# Patient Record
Sex: MALE | Race: WHITE | Employment: OTHER | URBAN - METROPOLITAN AREA
[De-identification: names, ages, dates, MRNs, and addresses within clinical notes are randomized per-mention and may not be internally consistent; named-entity substitution may affect disease eponyms.]

---

## 2019-05-29 ENCOUNTER — HOSPITAL ENCOUNTER (INPATIENT)
Age: 69
LOS: 6 days | Discharge: NURSING, REHAB OR LONG-TERM CARE | DRG: 189 | End: 2019-06-06
Attending: EMERGENCY MEDICINE | Admitting: INTERNAL MEDICINE
Payer: MEDICARE

## 2019-05-29 DIAGNOSIS — R09.02 HYPOXIA: ICD-10-CM

## 2019-05-29 DIAGNOSIS — J40 BRONCHITIS: ICD-10-CM

## 2019-05-29 DIAGNOSIS — J44.1 COPD EXACERBATION (HCC): Primary | ICD-10-CM

## 2019-05-29 PROCEDURE — 99285 EMERGENCY DEPT VISIT HI MDM: CPT

## 2019-05-29 PROCEDURE — 31500 INSERT EMERGENCY AIRWAY: CPT

## 2019-05-30 ENCOUNTER — APPOINTMENT (OUTPATIENT)
Dept: GENERAL RADIOLOGY | Age: 69
DRG: 189 | End: 2019-05-30
Payer: MEDICARE

## 2019-05-30 PROBLEM — J44.1 COPD EXACERBATION (HCC): Status: ACTIVE | Noted: 2019-05-30

## 2019-05-30 LAB
ALBUMIN SERPL-MCNC: 3.9 G/DL (ref 3.5–5.2)
ALP BLD-CCNC: 57 U/L (ref 40–129)
ALT SERPL-CCNC: 22 U/L (ref 0–40)
ANION GAP SERPL CALCULATED.3IONS-SCNC: 11 MMOL/L (ref 7–16)
AST SERPL-CCNC: 18 U/L (ref 0–39)
B.E.: 0.6 MMOL/L (ref -3–3)
BASOPHILS ABSOLUTE: 0.04 E9/L (ref 0–0.2)
BASOPHILS RELATIVE PERCENT: 0.5 % (ref 0–2)
BILIRUB SERPL-MCNC: 0.4 MG/DL (ref 0–1.2)
BUN BLDV-MCNC: 16 MG/DL (ref 8–23)
CALCIUM SERPL-MCNC: 8.7 MG/DL (ref 8.6–10.2)
CHLORIDE BLD-SCNC: 98 MMOL/L (ref 98–107)
CO2: 26 MMOL/L (ref 22–29)
COHB: 1.4 % (ref 0–1.5)
CREAT SERPL-MCNC: 0.9 MG/DL (ref 0.7–1.2)
CRITICAL: ABNORMAL
D DIMER: 254 NG/ML DDU
DATE ANALYZED: ABNORMAL
DATE OF COLLECTION: ABNORMAL
EKG ATRIAL RATE: 74 BPM
EKG P AXIS: 66 DEGREES
EKG P-R INTERVAL: 190 MS
EKG Q-T INTERVAL: 390 MS
EKG QRS DURATION: 90 MS
EKG QTC CALCULATION (BAZETT): 432 MS
EKG R AXIS: -47 DEGREES
EKG T AXIS: 54 DEGREES
EKG VENTRICULAR RATE: 74 BPM
EOSINOPHILS ABSOLUTE: 0.27 E9/L (ref 0.05–0.5)
EOSINOPHILS RELATIVE PERCENT: 3.2 % (ref 0–6)
GFR AFRICAN AMERICAN: >60
GFR NON-AFRICAN AMERICAN: >60 ML/MIN/1.73
GLUCOSE BLD-MCNC: 124 MG/DL (ref 74–99)
HCO3: 27.2 MMOL/L (ref 22–26)
HCT VFR BLD CALC: 52.7 % (ref 37–54)
HEMOGLOBIN: 17.7 G/DL (ref 12.5–16.5)
HHB: 4.2 % (ref 0–5)
IMMATURE GRANULOCYTES #: 0.03 E9/L
IMMATURE GRANULOCYTES %: 0.4 % (ref 0–5)
LAB: ABNORMAL
LACTIC ACID: 0.9 MMOL/L (ref 0.5–2.2)
LYMPHOCYTES ABSOLUTE: 1.59 E9/L (ref 1.5–4)
LYMPHOCYTES RELATIVE PERCENT: 18.8 % (ref 20–42)
Lab: ABNORMAL
MCH RBC QN AUTO: 30.8 PG (ref 26–35)
MCHC RBC AUTO-ENTMCNC: 33.6 % (ref 32–34.5)
MCV RBC AUTO: 91.7 FL (ref 80–99.9)
METHB: 0.3 % (ref 0–1.5)
MODE: ABNORMAL
MONOCYTES ABSOLUTE: 0.61 E9/L (ref 0.1–0.95)
MONOCYTES RELATIVE PERCENT: 7.2 % (ref 2–12)
NEUTROPHILS ABSOLUTE: 5.91 E9/L (ref 1.8–7.3)
NEUTROPHILS RELATIVE PERCENT: 69.9 % (ref 43–80)
O2 CONTENT: 24.1 ML/DL
O2 SATURATION: 95.7 % (ref 92–98.5)
O2HB: 94.1 % (ref 94–97)
OPERATOR ID: ABNORMAL
PATIENT TEMP: 37 C
PCO2: 50.2 MMHG (ref 35–45)
PDW BLD-RTO: 13.5 FL (ref 11.5–15)
PH BLOOD GAS: 7.35 (ref 7.35–7.45)
PLATELET # BLD: 174 E9/L (ref 130–450)
PMV BLD AUTO: 9.8 FL (ref 7–12)
PO2: 79.4 MMHG (ref 60–100)
POTASSIUM SERPL-SCNC: 4.2 MMOL/L (ref 3.5–5)
PRO-BNP: 402 PG/ML (ref 0–125)
RBC # BLD: 5.75 E12/L (ref 3.8–5.8)
SODIUM BLD-SCNC: 135 MMOL/L (ref 132–146)
SOURCE, BLOOD GAS: ABNORMAL
THB: 18.2 G/DL (ref 11.5–16.5)
TIME ANALYZED: 105
TOTAL PROTEIN: 7.4 G/DL (ref 6.4–8.3)
TROPONIN: <0.01 NG/ML (ref 0–0.03)
WBC # BLD: 8.5 E9/L (ref 4.5–11.5)

## 2019-05-30 PROCEDURE — G0378 HOSPITAL OBSERVATION PER HR: HCPCS

## 2019-05-30 PROCEDURE — 2580000003 HC RX 258: Performed by: EMERGENCY MEDICINE

## 2019-05-30 PROCEDURE — 96375 TX/PRO/DX INJ NEW DRUG ADDON: CPT

## 2019-05-30 PROCEDURE — 99222 1ST HOSP IP/OBS MODERATE 55: CPT | Performed by: INTERNAL MEDICINE

## 2019-05-30 PROCEDURE — 6370000000 HC RX 637 (ALT 250 FOR IP): Performed by: INTERNAL MEDICINE

## 2019-05-30 PROCEDURE — 82805 BLOOD GASES W/O2 SATURATION: CPT

## 2019-05-30 PROCEDURE — 96367 TX/PROPH/DG ADDL SEQ IV INF: CPT

## 2019-05-30 PROCEDURE — 80053 COMPREHEN METABOLIC PANEL: CPT

## 2019-05-30 PROCEDURE — 96376 TX/PRO/DX INJ SAME DRUG ADON: CPT

## 2019-05-30 PROCEDURE — 96365 THER/PROPH/DIAG IV INF INIT: CPT

## 2019-05-30 PROCEDURE — 6360000002 HC RX W HCPCS: Performed by: EMERGENCY MEDICINE

## 2019-05-30 PROCEDURE — 97165 OT EVAL LOW COMPLEX 30 MIN: CPT

## 2019-05-30 PROCEDURE — 94640 AIRWAY INHALATION TREATMENT: CPT

## 2019-05-30 PROCEDURE — 6370000000 HC RX 637 (ALT 250 FOR IP): Performed by: EMERGENCY MEDICINE

## 2019-05-30 PROCEDURE — 6360000002 HC RX W HCPCS: Performed by: INTERNAL MEDICINE

## 2019-05-30 PROCEDURE — APPSS45 APP SPLIT SHARED TIME 31-45 MINUTES: Performed by: PHYSICIAN ASSISTANT

## 2019-05-30 PROCEDURE — 6360000002 HC RX W HCPCS: Performed by: PHYSICIAN ASSISTANT

## 2019-05-30 PROCEDURE — 36415 COLL VENOUS BLD VENIPUNCTURE: CPT

## 2019-05-30 PROCEDURE — 83605 ASSAY OF LACTIC ACID: CPT

## 2019-05-30 PROCEDURE — 2700000000 HC OXYGEN THERAPY PER DAY

## 2019-05-30 PROCEDURE — 71045 X-RAY EXAM CHEST 1 VIEW: CPT

## 2019-05-30 PROCEDURE — 83880 ASSAY OF NATRIURETIC PEPTIDE: CPT

## 2019-05-30 PROCEDURE — 84484 ASSAY OF TROPONIN QUANT: CPT

## 2019-05-30 PROCEDURE — 93010 ELECTROCARDIOGRAM REPORT: CPT | Performed by: INTERNAL MEDICINE

## 2019-05-30 PROCEDURE — 93005 ELECTROCARDIOGRAM TRACING: CPT | Performed by: EMERGENCY MEDICINE

## 2019-05-30 PROCEDURE — 6370000000 HC RX 637 (ALT 250 FOR IP): Performed by: PHYSICIAN ASSISTANT

## 2019-05-30 PROCEDURE — 2580000003 HC RX 258: Performed by: PHYSICIAN ASSISTANT

## 2019-05-30 PROCEDURE — 85025 COMPLETE CBC W/AUTO DIFF WBC: CPT

## 2019-05-30 PROCEDURE — 96366 THER/PROPH/DIAG IV INF ADDON: CPT

## 2019-05-30 PROCEDURE — G0378 HOSPITAL OBSERVATION PER HR: HCPCS | Performed by: INTERNAL MEDICINE

## 2019-05-30 PROCEDURE — 85378 FIBRIN DEGRADE SEMIQUANT: CPT

## 2019-05-30 PROCEDURE — 96372 THER/PROPH/DIAG INJ SC/IM: CPT

## 2019-05-30 PROCEDURE — 87040 BLOOD CULTURE FOR BACTERIA: CPT

## 2019-05-30 PROCEDURE — 97161 PT EVAL LOW COMPLEX 20 MIN: CPT

## 2019-05-30 RX ORDER — ONDANSETRON 2 MG/ML
4 INJECTION INTRAMUSCULAR; INTRAVENOUS EVERY 6 HOURS PRN
Status: DISCONTINUED | OUTPATIENT
Start: 2019-05-30 | End: 2019-06-06 | Stop reason: HOSPADM

## 2019-05-30 RX ORDER — METHYLPREDNISOLONE SODIUM SUCCINATE 125 MG/2ML
125 INJECTION, POWDER, LYOPHILIZED, FOR SOLUTION INTRAMUSCULAR; INTRAVENOUS ONCE
Status: COMPLETED | OUTPATIENT
Start: 2019-05-30 | End: 2019-05-30

## 2019-05-30 RX ORDER — 0.9 % SODIUM CHLORIDE 0.9 %
500 INTRAVENOUS SOLUTION INTRAVENOUS ONCE
Status: COMPLETED | OUTPATIENT
Start: 2019-05-30 | End: 2019-05-30

## 2019-05-30 RX ORDER — NEBULIZER ACCESSORIES
1 KIT MISCELLANEOUS DAILY PRN
Qty: 1 KIT | Refills: 0 | Status: SHIPPED | OUTPATIENT
Start: 2019-05-30 | End: 2019-06-04 | Stop reason: HOSPADM

## 2019-05-30 RX ORDER — FUROSEMIDE 10 MG/ML
40 INJECTION INTRAMUSCULAR; INTRAVENOUS 2 TIMES DAILY
Status: DISCONTINUED | OUTPATIENT
Start: 2019-05-30 | End: 2019-05-31

## 2019-05-30 RX ORDER — PREDNISONE 20 MG/1
40 TABLET ORAL DAILY
Qty: 10 TABLET | Refills: 0 | Status: SHIPPED | OUTPATIENT
Start: 2019-05-30 | End: 2019-06-04 | Stop reason: HOSPADM

## 2019-05-30 RX ORDER — METHYLPREDNISOLONE SODIUM SUCCINATE 40 MG/ML
40 INJECTION, POWDER, LYOPHILIZED, FOR SOLUTION INTRAMUSCULAR; INTRAVENOUS EVERY 6 HOURS
Status: DISCONTINUED | OUTPATIENT
Start: 2019-05-30 | End: 2019-06-01

## 2019-05-30 RX ORDER — SODIUM CHLORIDE 0.9 % (FLUSH) 0.9 %
10 SYRINGE (ML) INJECTION PRN
Status: DISCONTINUED | OUTPATIENT
Start: 2019-05-30 | End: 2019-05-30 | Stop reason: SDUPTHER

## 2019-05-30 RX ORDER — ACETAMINOPHEN 325 MG/1
650 TABLET ORAL EVERY 4 HOURS PRN
Status: DISCONTINUED | OUTPATIENT
Start: 2019-05-30 | End: 2019-05-30 | Stop reason: SDUPTHER

## 2019-05-30 RX ORDER — SODIUM CHLORIDE 0.9 % (FLUSH) 0.9 %
10 SYRINGE (ML) INJECTION EVERY 12 HOURS SCHEDULED
Status: DISCONTINUED | OUTPATIENT
Start: 2019-05-30 | End: 2019-06-06 | Stop reason: HOSPADM

## 2019-05-30 RX ORDER — SODIUM CHLORIDE 0.9 % (FLUSH) 0.9 %
10 SYRINGE (ML) INJECTION PRN
Status: DISCONTINUED | OUTPATIENT
Start: 2019-05-30 | End: 2019-06-06 | Stop reason: HOSPADM

## 2019-05-30 RX ORDER — CLONIDINE HYDROCHLORIDE 0.1 MG/1
0.1 TABLET ORAL ONCE
Status: COMPLETED | OUTPATIENT
Start: 2019-05-30 | End: 2019-05-30

## 2019-05-30 RX ORDER — SODIUM CHLORIDE 0.9 % (FLUSH) 0.9 %
10 SYRINGE (ML) INJECTION EVERY 12 HOURS SCHEDULED
Status: DISCONTINUED | OUTPATIENT
Start: 2019-05-30 | End: 2019-05-30 | Stop reason: SDUPTHER

## 2019-05-30 RX ORDER — POTASSIUM CHLORIDE 20 MEQ/1
20 TABLET, EXTENDED RELEASE ORAL
Status: DISCONTINUED | OUTPATIENT
Start: 2019-05-30 | End: 2019-05-31

## 2019-05-30 RX ORDER — LORATADINE AND PSEUDOEPHEDRINE SULFATE 5; 120 MG/1; MG/1
1 TABLET, EXTENDED RELEASE ORAL 2 TIMES DAILY
Qty: 20 TABLET | Refills: 0 | Status: SHIPPED | OUTPATIENT
Start: 2019-05-30 | End: 2019-06-04 | Stop reason: HOSPADM

## 2019-05-30 RX ORDER — DOXYCYCLINE HYCLATE 100 MG
100 TABLET ORAL 2 TIMES DAILY
Qty: 20 TABLET | Refills: 0 | Status: SHIPPED | OUTPATIENT
Start: 2019-05-30 | End: 2019-06-04 | Stop reason: HOSPADM

## 2019-05-30 RX ORDER — ALBUTEROL SULFATE 2.5 MG/3ML
2.5 SOLUTION RESPIRATORY (INHALATION) EVERY 6 HOURS PRN
Qty: 120 EACH | Refills: 3 | Status: SHIPPED | OUTPATIENT
Start: 2019-05-30 | End: 2019-06-04 | Stop reason: HOSPADM

## 2019-05-30 RX ORDER — IPRATROPIUM BROMIDE AND ALBUTEROL SULFATE 2.5; .5 MG/3ML; MG/3ML
1 SOLUTION RESPIRATORY (INHALATION) EVERY 4 HOURS
Status: COMPLETED | OUTPATIENT
Start: 2019-05-30 | End: 2019-05-30

## 2019-05-30 RX ORDER — IPRATROPIUM BROMIDE AND ALBUTEROL SULFATE 2.5; .5 MG/3ML; MG/3ML
1 SOLUTION RESPIRATORY (INHALATION)
Status: COMPLETED | OUTPATIENT
Start: 2019-05-30 | End: 2019-05-30

## 2019-05-30 RX ORDER — ACETAMINOPHEN 325 MG/1
650 TABLET ORAL EVERY 4 HOURS PRN
Status: DISCONTINUED | OUTPATIENT
Start: 2019-05-30 | End: 2019-06-06 | Stop reason: HOSPADM

## 2019-05-30 RX ADMIN — METHYLPREDNISOLONE SODIUM SUCCINATE 125 MG: 125 INJECTION, POWDER, FOR SOLUTION INTRAMUSCULAR; INTRAVENOUS at 01:01

## 2019-05-30 RX ADMIN — CLONIDINE HYDROCHLORIDE 0.1 MG: 0.1 TABLET ORAL at 07:52

## 2019-05-30 RX ADMIN — IPRATROPIUM BROMIDE AND ALBUTEROL SULFATE 1 AMPULE: .5; 3 SOLUTION RESPIRATORY (INHALATION) at 01:15

## 2019-05-30 RX ADMIN — IPRATROPIUM BROMIDE AND ALBUTEROL SULFATE 1 AMPULE: .5; 3 SOLUTION RESPIRATORY (INHALATION) at 01:09

## 2019-05-30 RX ADMIN — SODIUM CHLORIDE 500 ML: 9 INJECTION, SOLUTION INTRAVENOUS at 00:52

## 2019-05-30 RX ADMIN — FUROSEMIDE 40 MG: 10 INJECTION, SOLUTION INTRAMUSCULAR; INTRAVENOUS at 17:47

## 2019-05-30 RX ADMIN — ENOXAPARIN SODIUM 50 MG: 100 INJECTION SUBCUTANEOUS at 21:57

## 2019-05-30 RX ADMIN — CEFTRIAXONE 1 G: 1 INJECTION, POWDER, FOR SOLUTION INTRAMUSCULAR; INTRAVENOUS at 01:03

## 2019-05-30 RX ADMIN — METOPROLOL TARTRATE 12.5 MG: 25 TABLET ORAL at 11:46

## 2019-05-30 RX ADMIN — Medication 10 ML: at 11:44

## 2019-05-30 RX ADMIN — POTASSIUM CHLORIDE 20 MEQ: 20 TABLET, EXTENDED RELEASE ORAL at 11:47

## 2019-05-30 RX ADMIN — AZITHROMYCIN MONOHYDRATE 500 MG: 500 INJECTION, POWDER, LYOPHILIZED, FOR SOLUTION INTRAVENOUS at 04:11

## 2019-05-30 RX ADMIN — IPRATROPIUM BROMIDE AND ALBUTEROL SULFATE 1 AMPULE: .5; 3 SOLUTION RESPIRATORY (INHALATION) at 12:42

## 2019-05-30 RX ADMIN — FUROSEMIDE 40 MG: 10 INJECTION, SOLUTION INTRAMUSCULAR; INTRAVENOUS at 11:44

## 2019-05-30 RX ADMIN — METHYLPREDNISOLONE SODIUM SUCCINATE 40 MG: 40 INJECTION, POWDER, FOR SOLUTION INTRAMUSCULAR; INTRAVENOUS at 15:46

## 2019-05-30 RX ADMIN — IPRATROPIUM BROMIDE AND ALBUTEROL SULFATE 1 AMPULE: .5; 3 SOLUTION RESPIRATORY (INHALATION) at 16:11

## 2019-05-30 RX ADMIN — ENOXAPARIN SODIUM 40 MG: 40 INJECTION SUBCUTANEOUS at 11:44

## 2019-05-30 RX ADMIN — Medication 10 ML: at 21:58

## 2019-05-30 ASSESSMENT — PAIN DESCRIPTION - LOCATION: LOCATION: LEG

## 2019-05-30 ASSESSMENT — PAIN SCALES - GENERAL: PAINLEVEL_OUTOF10: 2

## 2019-05-30 ASSESSMENT — PAIN DESCRIPTION - PAIN TYPE: TYPE: ACUTE PAIN

## 2019-05-30 ASSESSMENT — PAIN DESCRIPTION - ORIENTATION: ORIENTATION: RIGHT;LEFT

## 2019-05-30 NOTE — ED PROVIDER NOTES
Department of Emergency Medicine   ED  Provider Note  Admit Date/RoomTime: 5/29/2019 11:36 PM  ED Room: 09/09     HPI:    Trice Duggan is a 76 y.o. male presenting to the ED per EMS for shortness of breath, beginning 2 weeks ago. The complaint has been persistent, mild in severity, and worsened by nothing. Pt with Hx of A-flutter, CHF, presents to the ED due to gradual onset SOB with associated productive cough that began 2 weeks ago and became worsened over the last 24 hours. Pt states he is coughing up yellow phlegm, denies fever, N/V/D. Pt was recently on xarelto, recently taken off. Patient denies congestion, chest pain, headache, abdominal pain, constipation, hematochezia, melena, incontinence, dysuria, hematuria, trauma, neck or back pain or other complaints. ROS:   Pertinent positives and negatives are stated within HPI, all other systems reviewed and are negative.    ---------------------------------------- PAST HISTORY -------------------------------------------  Past Medical History:  has a past medical history of Atrial flutter ([facility] [address] and CHF (congestive heart failure) ([facility] [address] Past Surgical History:  has no past surgical history on file. Social History:  reports that he quit smoking about 3 years ago. His smoking use included cigarettes. He started smoking about 51 years ago. He smoked 1.50 packs per day. He has never used smokeless tobacco. He reports that he does not drink alcohol or use drugs. Family History: family history includes Alcohol Abuse in his brother; Crohn's Disease in his sister; Heart Failure in his father and mother. The patients home medications have been reviewed. Allergies: Patient has no known allergies.     --------------------------------------------- RESULTS -----------------------------------------------  All laboratory and radiology results have been personally reviewed by myself   LABS:  Results for orders placed or performed during the hospital encounter of 05/29/19   Troponin   Result Value Ref Range    Troponin <0.01 0.00 - 0.03 ng/mL   Brain Natriuretic Peptide   Result Value Ref Range    Pro- (H) 0 - 125 pg/mL   CBC Auto Differential   Result Value Ref Range    WBC 8.5 4.5 - 11.5 E9/L    RBC 5.75 3.80 - 5.80 E12/L    Hemoglobin 17.7 (H) 12.5 - 16.5 g/dL    Hematocrit 52.7 37.0 - 54.0 %    MCV 91.7 80.0 - 99.9 fL    MCH 30.8 26.0 - 35.0 pg    MCHC 33.6 32.0 - 34.5 %    RDW 13.5 11.5 - 15.0 fL    Platelets 628 309 - 530 E9/L    MPV 9.8 7.0 - 12.0 fL    Neutrophils % 69.9 43.0 - 80.0 %    Immature Granulocytes % 0.4 0.0 - 5.0 %    Lymphocytes % 18.8 (L) 20.0 - 42.0 %    Monocytes % 7.2 2.0 - 12.0 %    Eosinophils % 3.2 0.0 - 6.0 %    Basophils % 0.5 0.0 - 2.0 %    Neutrophils # 5.91 1.80 - 7.30 E9/L    Immature Granulocytes # 0.03 E9/L    Lymphocytes # 1.59 1.50 - 4.00 E9/L    Monocytes # 0.61 0.10 - 0.95 E9/L    Eosinophils # 0.27 0.05 - 0.50 E9/L    Basophils # 0.04 0.00 - 0.20 E9/L   Comprehensive Metabolic Panel   Result Value Ref Range    Sodium 135 132 - 146 mmol/L    Potassium 4.2 3.5 - 5.0 mmol/L    Chloride 98 98 - 107 mmol/L    CO2 26 22 - 29 mmol/L    Anion Gap 11 7 - 16 mmol/L    Glucose 124 (H) 74 - 99 mg/dL    BUN 16 8 - 23 mg/dL    CREATININE 0.9 0.7 - 1.2 mg/dL    GFR Non-African American >60 >=60 mL/min/1.73    GFR African American >60     Calcium 8.7 8.6 - 10.2 mg/dL    Total Protein 7.4 6.4 - 8.3 g/dL    Alb 3.9 3.5 - 5.2 g/dL    Total Bilirubin 0.4 0.0 - 1.2 mg/dL    Alkaline Phosphatase 57 40 - 129 U/L    ALT 22 0 - 40 U/L    AST 18 0 - 39 U/L   Lactic Acid, Plasma   Result Value Ref Range    Lactic Acid 0.9 0.5 - 2.2 mmol/L   Blood Gas, Arterial   Result Value Ref Range    Date Analyzed [id number]     Time Analyzed 0105     Source: Blood Arterial     pH, Blood Gas 7.352 7.350 - 7.450    PCO2 50.2 (H) 35.0 - 45.0 mmHg    PO2 79.4 60.0 - 100.0 mmHg    HCO3 27.2 (H) 22.0 - 26.0 mmol/L    B.E. 0.6 -3.0 - 3.0 mmol/L    O2 Sat 95.7 92.0 - 98.5 or rubs. 2+ distal pulses  Chest: No chest wall tenderness  GI:  Abdomen Soft, Non tender, Non distended. +BS. No rebound, guarding, or rigidity. No HPSM, no masses. Musculoskeletal: Moves all extremities x 4. Warm and well perfused, no clubbing, cyanosis, or edema. No calf tenderness  Integument: Skin warm and dry. No rashes. Neurologic: GCS 15, no focal deficits. Cranial nerves II-XII grossly intact. No meningeal signs. Psych: Normal Affect. No evidence of signs or sx of depression or psychosis.     ------------------------- ED COURSE/MEDICAL DECISION MAKING----------------------  Medications   0.9 % sodium chloride bolus (0 mLs Intravenous Stopped 5/30/19 0308)   methylPREDNISolone sodium (SOLU-MEDROL) injection 125 mg (125 mg Intravenous Given 5/30/19 0101)   ipratropium-albuterol (DUONEB) nebulizer solution 1 ampule (1 ampule Inhalation Given 5/30/19 0115)   cefTRIAXone (ROCEPHIN) 1 g in dextrose 5 % 50 mL IVPB (vial-mate) (0 g Intravenous Stopped 5/30/19 0410)   azithromycin (ZITHROMAX) 500 mg in D5W 250ml addavial (0 mg Intravenous Stopped 5/30/19 0515)       Medical Decision Making:   Differential Diagnosis:   Pneumonia, COPD, Asthma, CHF, MI, to name a few. Pt's CXR did not show any focal infiltrates nor any pattern of pulmonary edema  Pt. Work up results did not show any significant abnormalities and patient is felt to be appropriate for outpatient management. Re-Evaluation:  Time:   Re-evaluation. Patients symptoms   Repeat physical examination is     Counseling: The emergency provider has spoken with the patient and spouse/SO and discussed todays results, in addition to providing specific details for the plan of care and counseling regarding the diagnosis and prognosis. Questions are answered at this time and they are agreeable with the plan.    ---------------------------- IMPRESSION AND DISPOSITION -------------------------------    IMPRESSION  1. [facility]    2.  Bronchitis

## 2019-05-30 NOTE — ED NOTES
notified that patient spo2 88% on 3L/min NC     [facility]  06/66/81 4356

## 2019-05-30 NOTE — ED NOTES
Pt's wife states that she is concerned about safely getting the pt home; pt has been unable to ambulate much at home and spends most of his time sitting in a chair and urinating in bottles     Elisabeth Solorio RN  05/30/19 [id number]

## 2019-05-30 NOTE — ED NOTES
SBAR faxed to floor spoke with Everton Graham, copy of fax verificarion received and in chart, pt. to be transported to floor on portable cardiac monitor by ED staff.      Jayden Killian RN  05/30/19 [id number]

## 2019-05-30 NOTE — PROGRESS NOTES
obesity, SOB, weakness and fatigue in completion of all self care tasks. Strength ROM Additional Info:    RUE  4-/5 WFL good  and FMC/dexterity noted during ADL tasks     LUE 4-/5 WFL good  and FMC/dexterity noted during ADL tasks         Hearing: WFL   Vision: WFL   Sensation:  No c/o numbness or tingling   Tone: WFL   Edema: none                             Comments/Treatment: Patient educated on adapted techniques for completion of ADL, safe functional transfers and mobility. Patient required cues for follow through with proper hand/foot placement, pacing, safety, attention, sequencing and technique in bed mobility, functional transfers, UB dressing, LB dressing in preparation for maximum independence in all self care tasks. Eval Complexity: Low    Assessment of current deficits   Functional mobility [x]  ADLs [x] Strength [x]  Cognition []  Functional transfers  [x] IADLs [x] Safety Awareness [x]  Endurance [x]  Fine Motor Coordination [] Balance [x] Vision/perception [] Sensation []   Gross Motor Coordination [] ROM [] Delirium []                  Motor Control []    Plan of Care:   ADL retraining [x]   Equipment needs [x]   Neuromuscular re-education [x] Energy Conservation Techniques [x]  Functional Transfer training [x] Patient and/or Family Education [x]  Functional Mobility training [x]  Environmental Modifications [x]  Cognitive re-training []   Compensatory techniques for ADLs [x]  Splinting Needs []   Positioning to improve overall function [x]   Therapeutic Activity [x]  Therapeutic Exercise  [x]  Visual/Perceptual: []    Delirium prevention/treatment  []   Other:  []    Rehab Potential: Good for established goals     Patient / Family Goal: To get stronger. Patient and/or family were instructed on functional diagnosis, prognosis/goals and OT plan of care. Pt verbalized good understanding. Upon arrival, patient supine in bed.  At end of session, patient supine in bed with call light and phone within reach, all lines and tubes intact. Pt would benefit from continued skilled OT to increase safety and independence with completion of ADL/IADL tasks for functional independence and quality of life.  Bed/chair alarm: ON.     low Evaluation + 10 timed treatment minutes  Tx Time in: 1325  Tx Time out: 1335    Evaluation time includes thorough review of current medical information, gathering information on past medical history/social history and prior level of function, completion of standardized testing/informal observation of tasks, assessment of data, and development of POC/Goals    [place] OTR/L  [id number]
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
## 2019-05-30 NOTE — PROGRESS NOTES
Physical Therapy    Facility/Department: [facility] MED SURG  Initial Assessment    NAME: Milli Winters  : 1950  MRN: [id number]    Date of Service: 2019       REQUIRES PT FOLLOW UP: Yes       Patient Diagnosis(es): The primary encounter diagnosis was COPD exacerbation ([facility] [address] Diagnoses of Bronchitis and Hypoxia were also pertinent to this visit. has a past medical history of Atrial flutter ([facility] [address] and CHF (congestive heart failure) ([facility] [address] has no past surgical history on file. Evaluating Therapist: Maura Silveira PT         Room #: 105   DIAGNOSIS:  COPD exacerbation   PRECAUTIONS: falls     Social:  Pt lives with wife  in a  1  floor apartment no  steps  to enter. Prior to admission: pt reports he has not been walking. Independent with transfers per pt      Initial Evaluation  Date:  19 Treatment      Short Term/ Long Term   Goals   Was pt agreeable to Eval/treatment? yes      Does pt have pain?  chronic back pain      Bed Mobility  Rolling:  SBA   Supine to sit: max assist   Sit to supine: SBA   Scooting: SBA in sit . Mod assist in supine and repositioning    SBA    Transfers Sit to stand: mod assist   Stand to sit: min assist   Stand pivot:  NT   SBA    Ambulation   unable   10  feet with  ww  with  Min assist    LE ROM  AAROM WFL      LE strength  3- to 4-/ 5       AM- PAC RAW score              Pt is alert and Oriented x  3      Balance:  CGA /min assist with static stand , high fall risk     Endurance: poor, labored breathing with minimal activity   Chair alarm: yes      ASSESSMENT  Pt displays functional ability as noted in the objective portion of this evaluation. Comments/Treatment:   Pt required frequent rest breaks with mobility. Able to scoot in sit to [facility] with increased time.    Pt RTB after mobility per his request        Examination of body systems Decreased   Functional mobility  x   ROM    Strength x   Safety Awareness    Cognition    Endurance x   Sensation    Balance

## 2019-05-30 NOTE — ED NOTES
Bed: 09  Expected date:   Expected time:   Means of arrival:   Comments:  EMS     Adrián Manzo RN  53/87/06 2333

## 2019-05-30 NOTE — CONSULTS
incontinence. No hematuria. · Musculoskeletal: No gait disturbance, weakness or joint complaints. · Integumentary: No rash or pruritis. No abnormal pigmentation, hair or nail changes. · Neurological: No headache, diplopia, dizziness, tremor, change in muscle strength, numbness or tingling. No change in gait, balance, coordination, mood, affect, memory, mentation, behavior. · Psychiatric: No anxiety or depression. · Endocrine: No temperature intolerance, excessive thirst, fluid intake, urinary frequency, excessive appetite, or recent weight change. · Hematologic/Lymphatic: No abnormal bruising or bleeding, blood clots or swollen lymph nodes. No anemia, fever, chills, night sweats, or swollen glands. · Allergic/Immunologic: No seasonal or perenial allergies. No history of hives or atopic dermatitis. Social History:  · Alcohol:  No  · Tobacco:   Roughly 75 pack years, quit   · Employment:  no silica or asbestos exposure  · Family:  No family history of lung disease    Medications:     metoprolol tartrate  12.5 mg Oral Daily    potassium chloride  20 mEq Oral Daily with breakfast    furosemide  40 mg Intravenous BID    sodium chloride flush  10 mL Intravenous 2 times per day    enoxaparin  50 mg Subcutaneous BID    methylPREDNISolone  40 mg Intravenous Q6H    ipratropium-albuterol  1 ampule Inhalation Q4H       Vitals:  Tmax:  VITALS:  BP (!) 167/90   Pulse 83   Temp 97.2 °F (36.2 °C) (Axillary)   Resp 24   Ht 5' 8\" (1.727 m)   Wt (!) 357 lb 0.3 oz (161.9 kg)   SpO2 92%   BMI 54.28 kg/m²   24HR INTAKE/OUTPUT:      Intake/Output Summary (Last 24 hours) at 2019 1231  Last data filed at 2019 1125  Gross per 24 hour   Intake 490 ml   Output 100 ml   Net 390 ml     CURRENT PULSE OXIMETRY:  SpO2: 92 %  24HR PULSE OXIMETRY RANGE:  SpO2  Av.8 %  Min: 90 %  Max: 95 %    EXAM:  General: No distress. Alert. Eyes: PERRL. No sclera icterus. No conjunctival injection. ENT: No discharge. given the well's score of 0 and the respiratory acidosis seen. Thanks for letting us see this patient in consultation. Please contact us with any questions. Office [phone] or after hours through [employer], x [phone]. Please note that voice recognition technology was used in the preparation of this note and make therefore it may contain inadvertent transcription errors    Gilma Díaz M.D., F.C.C.P.     [facility], [address] [place] [address], [address] [place] [address]

## 2019-05-31 ENCOUNTER — APPOINTMENT (OUTPATIENT)
Dept: GENERAL RADIOLOGY | Age: 69
DRG: 189 | End: 2019-05-31
Payer: MEDICARE

## 2019-05-31 LAB
ALBUMIN SERPL-MCNC: 4.1 G/DL (ref 3.5–5.2)
ALP BLD-CCNC: 52 U/L (ref 40–129)
ALT SERPL-CCNC: 29 U/L (ref 0–40)
ANION GAP SERPL CALCULATED.3IONS-SCNC: 13 MMOL/L (ref 7–16)
AST SERPL-CCNC: 33 U/L (ref 0–39)
BASOPHILS ABSOLUTE: 0.01 E9/L (ref 0–0.2)
BASOPHILS RELATIVE PERCENT: 0.1 % (ref 0–2)
BILIRUB SERPL-MCNC: 0.3 MG/DL (ref 0–1.2)
BUN BLDV-MCNC: 20 MG/DL (ref 8–23)
CALCIUM SERPL-MCNC: 8.7 MG/DL (ref 8.6–10.2)
CHLORIDE BLD-SCNC: 101 MMOL/L (ref 98–107)
CO2: 27 MMOL/L (ref 22–29)
CREAT SERPL-MCNC: 0.9 MG/DL (ref 0.7–1.2)
EOSINOPHILS ABSOLUTE: 0 E9/L (ref 0.05–0.5)
EOSINOPHILS RELATIVE PERCENT: 0 % (ref 0–6)
GFR AFRICAN AMERICAN: >60
GFR NON-AFRICAN AMERICAN: >60 ML/MIN/1.73
GLUCOSE BLD-MCNC: 167 MG/DL (ref 74–99)
HCT VFR BLD CALC: 54 % (ref 37–54)
HEMOGLOBIN: 17.8 G/DL (ref 12.5–16.5)
IMMATURE GRANULOCYTES #: 0.04 E9/L
IMMATURE GRANULOCYTES %: 0.4 % (ref 0–5)
LYMPHOCYTES ABSOLUTE: 1.19 E9/L (ref 1.5–4)
LYMPHOCYTES RELATIVE PERCENT: 10.9 % (ref 20–42)
MCH RBC QN AUTO: 30.4 PG (ref 26–35)
MCHC RBC AUTO-ENTMCNC: 33 % (ref 32–34.5)
MCV RBC AUTO: 92.2 FL (ref 80–99.9)
MONOCYTES ABSOLUTE: 0.56 E9/L (ref 0.1–0.95)
MONOCYTES RELATIVE PERCENT: 5.1 % (ref 2–12)
NEUTROPHILS ABSOLUTE: 9.08 E9/L (ref 1.8–7.3)
NEUTROPHILS RELATIVE PERCENT: 83.5 % (ref 43–80)
PDW BLD-RTO: 13.6 FL (ref 11.5–15)
PLATELET # BLD: 227 E9/L (ref 130–450)
PMV BLD AUTO: 10 FL (ref 7–12)
POTASSIUM REFLEX MAGNESIUM: 5.6 MMOL/L (ref 3.5–5)
RBC # BLD: 5.86 E12/L (ref 3.8–5.8)
SODIUM BLD-SCNC: 141 MMOL/L (ref 132–146)
TOTAL PROTEIN: 7.5 G/DL (ref 6.4–8.3)
WBC # BLD: 10.9 E9/L (ref 4.5–11.5)

## 2019-05-31 PROCEDURE — 36415 COLL VENOUS BLD VENIPUNCTURE: CPT

## 2019-05-31 PROCEDURE — 6360000002 HC RX W HCPCS: Performed by: PHYSICIAN ASSISTANT

## 2019-05-31 PROCEDURE — 6360000002 HC RX W HCPCS: Performed by: INTERNAL MEDICINE

## 2019-05-31 PROCEDURE — 1200000000 HC SEMI PRIVATE

## 2019-05-31 PROCEDURE — 85025 COMPLETE CBC W/AUTO DIFF WBC: CPT

## 2019-05-31 PROCEDURE — APPSS30 APP SPLIT SHARED TIME 16-30 MINUTES: Performed by: PHYSICIAN ASSISTANT

## 2019-05-31 PROCEDURE — 80053 COMPREHEN METABOLIC PANEL: CPT

## 2019-05-31 PROCEDURE — 99232 SBSQ HOSP IP/OBS MODERATE 35: CPT | Performed by: INTERNAL MEDICINE

## 2019-05-31 PROCEDURE — G0378 HOSPITAL OBSERVATION PER HR: HCPCS

## 2019-05-31 PROCEDURE — 72070 X-RAY EXAM THORAC SPINE 2VWS: CPT

## 2019-05-31 PROCEDURE — 72100 X-RAY EXAM L-S SPINE 2/3 VWS: CPT

## 2019-05-31 PROCEDURE — 2700000000 HC OXYGEN THERAPY PER DAY

## 2019-05-31 PROCEDURE — 96376 TX/PRO/DX INJ SAME DRUG ADON: CPT

## 2019-05-31 PROCEDURE — 96372 THER/PROPH/DIAG INJ SC/IM: CPT

## 2019-05-31 PROCEDURE — 2580000003 HC RX 258: Performed by: PHYSICIAN ASSISTANT

## 2019-05-31 PROCEDURE — 6370000000 HC RX 637 (ALT 250 FOR IP): Performed by: PHYSICIAN ASSISTANT

## 2019-05-31 RX ORDER — FUROSEMIDE 10 MG/ML
40 INJECTION INTRAMUSCULAR; INTRAVENOUS DAILY
Status: DISCONTINUED | OUTPATIENT
Start: 2019-06-01 | End: 2019-05-31

## 2019-05-31 RX ORDER — POTASSIUM CHLORIDE 750 MG/1
10 TABLET, EXTENDED RELEASE ORAL
Status: DISCONTINUED | OUTPATIENT
Start: 2019-06-01 | End: 2019-06-06 | Stop reason: HOSPADM

## 2019-05-31 RX ORDER — FUROSEMIDE 40 MG/1
40 TABLET ORAL DAILY
Status: DISCONTINUED | OUTPATIENT
Start: 2019-06-01 | End: 2019-06-06 | Stop reason: HOSPADM

## 2019-05-31 RX ADMIN — METOPROLOL TARTRATE 12.5 MG: 25 TABLET ORAL at 08:37

## 2019-05-31 RX ADMIN — Medication 10 ML: at 20:13

## 2019-05-31 RX ADMIN — ENOXAPARIN SODIUM 50 MG: 100 INJECTION SUBCUTANEOUS at 20:13

## 2019-05-31 RX ADMIN — Medication 10 ML: at 12:38

## 2019-05-31 RX ADMIN — ENOXAPARIN SODIUM 50 MG: 100 INJECTION SUBCUTANEOUS at 08:36

## 2019-05-31 RX ADMIN — METHYLPREDNISOLONE SODIUM SUCCINATE 40 MG: 40 INJECTION, POWDER, FOR SOLUTION INTRAMUSCULAR; INTRAVENOUS at 07:00

## 2019-05-31 RX ADMIN — METHYLPREDNISOLONE SODIUM SUCCINATE 40 MG: 40 INJECTION, POWDER, FOR SOLUTION INTRAMUSCULAR; INTRAVENOUS at 12:37

## 2019-05-31 RX ADMIN — Medication 10 ML: at 08:37

## 2019-05-31 RX ADMIN — METHYLPREDNISOLONE SODIUM SUCCINATE 40 MG: 40 INJECTION, POWDER, FOR SOLUTION INTRAMUSCULAR; INTRAVENOUS at 17:40

## 2019-05-31 RX ADMIN — Medication 10 ML: at 17:40

## 2019-05-31 RX ADMIN — FUROSEMIDE 40 MG: 10 INJECTION, SOLUTION INTRAMUSCULAR; INTRAVENOUS at 08:37

## 2019-05-31 RX ADMIN — METHYLPREDNISOLONE SODIUM SUCCINATE 40 MG: 40 INJECTION, POWDER, FOR SOLUTION INTRAMUSCULAR; INTRAVENOUS at 03:06

## 2019-05-31 ASSESSMENT — PAIN SCALES - GENERAL: PAINLEVEL_OUTOF10: 0
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
## 2019-05-31 NOTE — CARE COORDINATION
CM met with pt in room to discuss role, anticipated LOS & current plan of care. Made INP admission today. IMM explained, signed & copy provided to pt. States he just met with the SW & she will be going through his wife for all decisions. Will follow.

## 2019-05-31 NOTE — PLAN OF CARE
Problem: Risk for Impaired Skin Integrity  Goal: Tissue integrity - skin and mucous membranes  Description  Structural intactness and normal physiological function of skin and  mucous membranes.   Outcome: Met This Shift     Problem: Falls - Risk of:  Goal: Will remain free from falls  Description  Will remain free from falls  Outcome: Met This Shift     Problem: Mobility - Impaired:  Goal: Mobility will improve  Description  Mobility will improve  Outcome: Ongoing     Problem: Breathing Pattern - Ineffective:  Goal: Ability to achieve and maintain a regular respiratory rate will improve  Description  Ability to achieve and maintain a regular respiratory rate will improve  Outcome: Ongoing

## 2019-05-31 NOTE — PROGRESS NOTES
Date: 5/30/2019    Time: 10:06 PM    Patient Placed On BIPAP/CPAP/ Non-Invasive Ventilation? No    If no must comment. Facial area red/color change? No           If YES are Blister/Lesion present? No   If yes must notify nursing staff  BIPAP/CPAP skin barrier? No    Skin barrier type:n/a       Comments: Patient refused to wear BiPAP. Patient told me \"get the hell out of here, I will throw the BiPAP away if you try to put it on me\". Machine in room on standby at bedside if needed.         Bassam Valle
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
## 2019-05-31 NOTE — PROGRESS NOTES
CALCIUM 8.7 8.7       Recent Labs     05/30/19  0044 05/31/19  0220   WBC 8.5 10.9   RBC 5.75 5.86*   HGB 17.7* 17.8*   HCT 52.7 54.0   MCV 91.7 92.2   MCH 30.8 30.4   MCHC 33.6 33.0   RDW 13.5 13.6    227   MPV 9.8 10.0       Radiology:   XR CHEST PORTABLE   Final Result      Cardiomegaly      No evidence of airspace consolidation or pulmonary venous congestion. Assessment:    Active Problems:    COPD exacerbation (HCC)  Resolved Problems:    * No resolved hospital problems. *      Plan:  1. Acute Hypoxic Respiratory Failure   - Pulm following, appreciate their recommendations   - On solumedrol 40mg Q6  - Will decrease lasix to 40 daily     2. HTN   - Improved, continue Metoprolol     3. Hyperkalemia   - Specimen Hemolyzed, may be artificially elevated, will continue  To monitor   - K 5.6 today, on KCL 20 mEq d/t lasix, not given today, will decrease to 10mEq    4. Deconditioning   - PT score 11/24, appreciate social work input     5.  H/o Atrial Flutter   - no longer on Xarelto, rate well controlled       Electronically signed by Jared Kelley PA-C on 5/31/2019 at 10:11 AM

## 2019-06-01 LAB
ALBUMIN SERPL-MCNC: 4.1 G/DL (ref 3.5–5.2)
ALP BLD-CCNC: 49 U/L (ref 40–129)
ALT SERPL-CCNC: 32 U/L (ref 0–40)
ANION GAP SERPL CALCULATED.3IONS-SCNC: 10 MMOL/L (ref 7–16)
AST SERPL-CCNC: 21 U/L (ref 0–39)
BILIRUB SERPL-MCNC: 0.3 MG/DL (ref 0–1.2)
BUN BLDV-MCNC: 24 MG/DL (ref 8–23)
CALCIUM SERPL-MCNC: 9.1 MG/DL (ref 8.6–10.2)
CHLORIDE BLD-SCNC: 98 MMOL/L (ref 98–107)
CO2: 31 MMOL/L (ref 22–29)
CREAT SERPL-MCNC: 0.9 MG/DL (ref 0.7–1.2)
GFR AFRICAN AMERICAN: >60
GFR NON-AFRICAN AMERICAN: >60 ML/MIN/1.73
GLUCOSE BLD-MCNC: 133 MG/DL (ref 74–99)
LV EF: 60 %
LVEF MODALITY: NORMAL
POTASSIUM SERPL-SCNC: 4.1 MMOL/L (ref 3.5–5)
SODIUM BLD-SCNC: 139 MMOL/L (ref 132–146)
TOTAL PROTEIN: 7.2 G/DL (ref 6.4–8.3)

## 2019-06-01 PROCEDURE — 2700000000 HC OXYGEN THERAPY PER DAY

## 2019-06-01 PROCEDURE — 94660 CPAP INITIATION&MGMT: CPT

## 2019-06-01 PROCEDURE — 6370000000 HC RX 637 (ALT 250 FOR IP): Performed by: PHYSICIAN ASSISTANT

## 2019-06-01 PROCEDURE — 99232 SBSQ HOSP IP/OBS MODERATE 35: CPT | Performed by: INTERNAL MEDICINE

## 2019-06-01 PROCEDURE — 6370000000 HC RX 637 (ALT 250 FOR IP): Performed by: INTERNAL MEDICINE

## 2019-06-01 PROCEDURE — 1200000000 HC SEMI PRIVATE

## 2019-06-01 PROCEDURE — 93306 TTE W/DOPPLER COMPLETE: CPT

## 2019-06-01 PROCEDURE — 6360000002 HC RX W HCPCS: Performed by: INTERNAL MEDICINE

## 2019-06-01 PROCEDURE — 2580000003 HC RX 258: Performed by: PHYSICIAN ASSISTANT

## 2019-06-01 PROCEDURE — 36415 COLL VENOUS BLD VENIPUNCTURE: CPT

## 2019-06-01 PROCEDURE — 80053 COMPREHEN METABOLIC PANEL: CPT

## 2019-06-01 PROCEDURE — 94640 AIRWAY INHALATION TREATMENT: CPT

## 2019-06-01 RX ORDER — IPRATROPIUM BROMIDE AND ALBUTEROL SULFATE 2.5; .5 MG/3ML; MG/3ML
1 SOLUTION RESPIRATORY (INHALATION) EVERY 4 HOURS
Status: DISCONTINUED | OUTPATIENT
Start: 2019-06-01 | End: 2019-06-02

## 2019-06-01 RX ADMIN — ENOXAPARIN SODIUM 50 MG: 100 INJECTION SUBCUTANEOUS at 09:13

## 2019-06-01 RX ADMIN — IPRATROPIUM BROMIDE AND ALBUTEROL SULFATE 1 AMPULE: .5; 3 SOLUTION RESPIRATORY (INHALATION) at 12:40

## 2019-06-01 RX ADMIN — ENOXAPARIN SODIUM 50 MG: 100 INJECTION SUBCUTANEOUS at 20:57

## 2019-06-01 RX ADMIN — IPRATROPIUM BROMIDE AND ALBUTEROL SULFATE 1 AMPULE: .5; 3 SOLUTION RESPIRATORY (INHALATION) at 16:02

## 2019-06-01 RX ADMIN — IPRATROPIUM BROMIDE AND ALBUTEROL SULFATE 1 AMPULE: .5; 3 SOLUTION RESPIRATORY (INHALATION) at 20:07

## 2019-06-01 RX ADMIN — METHYLPREDNISOLONE SODIUM SUCCINATE 40 MG: 40 INJECTION, POWDER, FOR SOLUTION INTRAMUSCULAR; INTRAVENOUS at 06:49

## 2019-06-01 RX ADMIN — Medication 10 ML: at 01:05

## 2019-06-01 RX ADMIN — FUROSEMIDE 40 MG: 40 TABLET ORAL at 06:50

## 2019-06-01 RX ADMIN — Medication 10 ML: at 20:57

## 2019-06-01 RX ADMIN — METHYLPREDNISOLONE SODIUM SUCCINATE 40 MG: 40 INJECTION, POWDER, FOR SOLUTION INTRAMUSCULAR; INTRAVENOUS at 01:05

## 2019-06-01 RX ADMIN — Medication 10 ML: at 09:13

## 2019-06-01 RX ADMIN — METOPROLOL TARTRATE 12.5 MG: 25 TABLET ORAL at 09:13

## 2019-06-01 RX ADMIN — POTASSIUM CHLORIDE 10 MEQ: 10 TABLET, EXTENDED RELEASE ORAL at 14:25

## 2019-06-01 ASSESSMENT — PAIN SCALES - GENERAL: PAINLEVEL_OUTOF10: 0

## 2019-06-01 NOTE — PROGRESS NOTES
Pulmonary Progress Note    Admit Date: 2019  Hospital day                               PCP: Savannah Ackerman DO    Chief Complaint (s):  Patient Active Problem List   Diagnosis    COPD exacerbation ([facility] [address]       Subjective:  · Payam tolerated noninvasive mechanical ventilation quite well last night. He notes that he slept until 8 AM when they woke him up. He feels well rested today and is awake and alert. Vitals:  VITALS:  BP (!) 115/59   Pulse 67   Temp 98.5 °F (36.9 °C) (Oral)   Resp 18   Ht 5' 8\" (1.727 m)   Wt (!) 357 lb 0.3 oz (161.9 kg)   SpO2 92%   BMI 54.28 kg/m²     24HR INTAKE/OUTPUT:      Intake/Output Summary (Last 24 hours) at 2019 1202  Last data filed at 2019 1142  Gross per 24 hour   Intake 600 ml   Output 1575 ml   Net -975 ml       24HR PULSE OXIMETRY RANGE:    SpO2  Av.5 %  Min: 92 %  Max: 93 %    Medications:  IV:      Scheduled Meds:   potassium chloride  10 mEq Oral Daily with breakfast    furosemide  40 mg Oral Daily    metoprolol tartrate  12.5 mg Oral Daily    sodium chloride flush  10 mL Intravenous 2 times per day    enoxaparin  50 mg Subcutaneous BID    methylPREDNISolone  40 mg Intravenous Q6H       Diet:   DIET CARB CONTROL;     EXAM:  General: No distress. Alert. Eyes: PERRL. No sclera icterus. No conjunctival injection. ENT: No discharge. Pharynx clear. Neck: Trachea midline. Normal thyroid. Resp: No accessory muscle use. No rales. No wheezing. No rhonchi. CV: Regular rate. Regular rhythm. No murmur or rub. Abd: Non-tender. Non-distended. No masses. No organomegaly. Normal bowel sounds. Morbidly obese. Skin: Warm and dry. No nodule on exposed extremities. No rash on exposed extremities. Ext: No cyanosis, clubbing, edema  Lymph: No cervical LAD. No supraclavicular LAD. M/S: No cyanosis. No joint deformity. No clubbing. Neuro: Awake. Follows commands. Positive pupils/gag/corneals. Normal pain response. Results:  CBC:   Recent Labs     05/30/19 0044 05/31/19 0220   WBC 8.5 10.9   HGB 17.7* 17.8*   HCT 52.7 54.0   MCV 91.7 92.2    227     BMP:   Recent Labs     05/30/19 0044 05/31/19 0220    141   K 4.2 5.6*   CL 98 101   CO2 26 27   BUN 16 20   CREATININE 0.9 0.9     LIVER PROFILE:   Recent Labs     05/30/19 0044 05/31/19 0220   AST 18 33   ALT 22 29   BILITOT 0.4 0.3   ALKPHOS 57 52     PT/INR: No results for input(s): PROTIME, INR in the last 72 hours. APTT: No results for input(s): APTT in the last 72 hours. Pathology:  1. N/A      Microbiology:  1. None    Recent ABG:   Recent Labs     05/30/19 0105   PH 7.352   PO2 79.4   PCO2 50.2*   HCO3 27.2*   BE 0.6   O2SAT 95.7   METHB 0.3   O2HB 94.1   COHB 1.4   O2CON 24.1   HHB 4.2   THB 18.2*     FiO2 : 35 %       Recent Films:  XR LUMBAR SPINE (2-3 VIEWS)   Final Result    Findings consistent with moderate degenerative disc disease and   degenerative facets disease of the lumbar spine. The exam has been dictated and signed by Sarah Caldwell. Danielle Holbrook CNP. Dariel Duncan MD, Radiologist, have reviewed the images and   report and concur with these findings. XR THORACIC SPINE (2 VIEWS)   Final Result    Findings consistent with moderate degenerative disc disease and   degenerative facets disease of the cervical spine. The exam has been dictated and signed by Sarah Holbrook CNP. Dariel Duncan MD, Radiologist, have reviewed the images and   report and concur with these findings. XR CHEST PORTABLE   Final Result      Cardiomegaly      No evidence of airspace consolidation or pulmonary venous congestion. Assessment:  1. Acute hypoxic respiratory failure  2. Acute on chronic hypercapnic respiratory failure: Underlying COPD  3. Morbid obesity with likely obesity hypoventilation syndrome  4. Sleep apnea  5. Immobility secondary to back injury  6.  Secondary polycythemia        Plan:  1. Treat his exacerbation of COPD, decrease steroids, adjust aerosols  2. Ventilate at bedtime and with naps. 3. Consider LTAC referral        Care reviewed with the staff and the patient's family as available. Please note that voice recognition technology was used in the preparation of this note and make therefore it may contain inadvertent transcription errors. Blanca Gallardo M.D., F.C.C.P.     [facility], [address] [place] [address], [address] [place] [address]

## 2019-06-01 NOTE — PROGRESS NOTES
[facility]   [facility] Note    Admitting Date and Time: 5/29/2019 11:36 PM  Admit Dx: COPD exacerbation ([address] [J44.1]  COPD exacerbation ([address] [J44.1]  COPD exacerbation ([address] [J44.1]    Subjective: Patient admitted with SOB, has Obesity, Per cardiology stable DHF, AM-PAC of 11, Per pulmonary Acute on Chronic Hypercapnic Respiratory failure as well as MARY JANE. Patient can not tolerate Bi pap. Back films with DJD. Patient was admitted with COPD exacerbation ([address] [J44.1]  COPD exacerbation ([address] [J44.1]  COPD exacerbation ([address] [J44.1]. Patient feels ok. Per RN: No new complains    ROS: denies fever, chills, cp, sob, n/v, HA unless stated above.      potassium chloride  10 mEq Oral Daily with breakfast    furosemide  40 mg Oral Daily    metoprolol tartrate  12.5 mg Oral Daily    sodium chloride flush  10 mL Intravenous 2 times per day    enoxaparin  50 mg Subcutaneous BID    methylPREDNISolone  40 mg Intravenous Q6H       acetaminophen 650 mg Q4H PRN   sodium chloride flush 10 mL PRN   magnesium hydroxide 30 mL Daily PRN   ondansetron 4 mg Q6H PRN        Objective:    BP (!) 115/59   Pulse 67   Temp 98.5 °F (36.9 °C) (Oral)   Resp 18   Ht 5' 8\" (1.727 m)   Wt (!) 357 lb 0.3 oz (161.9 kg)   SpO2 92%   BMI 54.28 kg/m²   General Appearance: alert and oriented to person, place and time, well-developed and well-nourished, in no acute distress  Skin: warm and dry, no rash or erythema  Head: normocephalic and atraumatic  Eyes: pupils equal, round, and reactive to light, extraocular eye movements intact, conjunctivae normal  ENT: tympanic membrane, external ear and ear canal normal bilaterally, oropharynx clear and moist with normal mucous membranes  Neck: neck supple and non tender without mass, no thyromegaly or thyroid nodules, no cervical lymphadenopathy   Pulmonary/Chest: clear to auscultation bilaterally- no wheezes, rales or rhonchi, normal air movement, no respiratory

## 2019-06-01 NOTE — PROGRESS NOTES
Date: 6/1/2019    Time: 12:34 AM    Patient Placed On BIPAP/CPAP/ Non-Invasive Ventilation? Yes    If no must comment. Facial area red/color change? No           If YES are Blister/Lesion present? No   If yes must notify nursing staff  BIPAP/CPAP skin barrier?   Yes    Skin barrier type:Liquicel       Comments:        Evette Wilder

## 2019-06-02 LAB
ALBUMIN SERPL-MCNC: 4 G/DL (ref 3.5–5.2)
ALP BLD-CCNC: 45 U/L (ref 40–129)
ALT SERPL-CCNC: 42 U/L (ref 0–40)
ANION GAP SERPL CALCULATED.3IONS-SCNC: 10 MMOL/L (ref 7–16)
AST SERPL-CCNC: 35 U/L (ref 0–39)
BILIRUB SERPL-MCNC: 0.3 MG/DL (ref 0–1.2)
BUN BLDV-MCNC: 25 MG/DL (ref 8–23)
CALCIUM SERPL-MCNC: 8.4 MG/DL (ref 8.6–10.2)
CHLORIDE BLD-SCNC: 98 MMOL/L (ref 98–107)
CO2: 34 MMOL/L (ref 22–29)
CREAT SERPL-MCNC: 0.9 MG/DL (ref 0.7–1.2)
GFR AFRICAN AMERICAN: >60
GFR NON-AFRICAN AMERICAN: >60 ML/MIN/1.73
GLUCOSE BLD-MCNC: 97 MG/DL (ref 74–99)
POTASSIUM SERPL-SCNC: 3.5 MMOL/L (ref 3.5–5)
SODIUM BLD-SCNC: 142 MMOL/L (ref 132–146)
TOTAL PROTEIN: 7 G/DL (ref 6.4–8.3)

## 2019-06-02 PROCEDURE — 6370000000 HC RX 637 (ALT 250 FOR IP): Performed by: INTERNAL MEDICINE

## 2019-06-02 PROCEDURE — 99232 SBSQ HOSP IP/OBS MODERATE 35: CPT | Performed by: INTERNAL MEDICINE

## 2019-06-02 PROCEDURE — 1200000000 HC SEMI PRIVATE

## 2019-06-02 PROCEDURE — 80053 COMPREHEN METABOLIC PANEL: CPT

## 2019-06-02 PROCEDURE — APPSS30 APP SPLIT SHARED TIME 16-30 MINUTES: Performed by: PHYSICIAN ASSISTANT

## 2019-06-02 PROCEDURE — 6360000002 HC RX W HCPCS: Performed by: INTERNAL MEDICINE

## 2019-06-02 PROCEDURE — 2580000003 HC RX 258: Performed by: PHYSICIAN ASSISTANT

## 2019-06-02 PROCEDURE — 36415 COLL VENOUS BLD VENIPUNCTURE: CPT

## 2019-06-02 PROCEDURE — 2700000000 HC OXYGEN THERAPY PER DAY

## 2019-06-02 PROCEDURE — 94640 AIRWAY INHALATION TREATMENT: CPT

## 2019-06-02 PROCEDURE — 93005 ELECTROCARDIOGRAM TRACING: CPT | Performed by: INTERNAL MEDICINE

## 2019-06-02 PROCEDURE — 94660 CPAP INITIATION&MGMT: CPT

## 2019-06-02 PROCEDURE — 6370000000 HC RX 637 (ALT 250 FOR IP): Performed by: PHYSICIAN ASSISTANT

## 2019-06-02 PROCEDURE — 2500000003 HC RX 250 WO HCPCS: Performed by: INTERNAL MEDICINE

## 2019-06-02 PROCEDURE — 2580000003 HC RX 258: Performed by: INTERNAL MEDICINE

## 2019-06-02 RX ORDER — DILTIAZEM HYDROCHLORIDE 5 MG/ML
10 INJECTION INTRAVENOUS ONCE
Status: COMPLETED | OUTPATIENT
Start: 2019-06-02 | End: 2019-06-02

## 2019-06-02 RX ORDER — DIGOXIN 0.25 MG/ML
250 INJECTION INTRAMUSCULAR; INTRAVENOUS ONCE
Status: COMPLETED | OUTPATIENT
Start: 2019-06-02 | End: 2019-06-02

## 2019-06-02 RX ORDER — POTASSIUM CHLORIDE 20 MEQ/1
40 TABLET, EXTENDED RELEASE ORAL ONCE
Status: COMPLETED | OUTPATIENT
Start: 2019-06-02 | End: 2019-06-02

## 2019-06-02 RX ADMIN — POTASSIUM CHLORIDE 40 MEQ: 20 TABLET, EXTENDED RELEASE ORAL at 16:05

## 2019-06-02 RX ADMIN — DIGOXIN 250 MCG: 0.25 INJECTION INTRAMUSCULAR; INTRAVENOUS at 16:05

## 2019-06-02 RX ADMIN — IPRATROPIUM BROMIDE AND ALBUTEROL SULFATE 1 AMPULE: .5; 3 SOLUTION RESPIRATORY (INHALATION) at 08:58

## 2019-06-02 RX ADMIN — Medication 10 ML: at 09:45

## 2019-06-02 RX ADMIN — ENOXAPARIN SODIUM 50 MG: 100 INJECTION SUBCUTANEOUS at 11:52

## 2019-06-02 RX ADMIN — METOPROLOL TARTRATE 25 MG: 25 TABLET ORAL at 18:20

## 2019-06-02 RX ADMIN — ENOXAPARIN SODIUM 50 MG: 100 INJECTION SUBCUTANEOUS at 21:04

## 2019-06-02 RX ADMIN — FUROSEMIDE 40 MG: 40 TABLET ORAL at 06:23

## 2019-06-02 RX ADMIN — IPRATROPIUM BROMIDE AND ALBUTEROL SULFATE 1 AMPULE: .5; 3 SOLUTION RESPIRATORY (INHALATION) at 12:19

## 2019-06-02 RX ADMIN — DILTIAZEM HYDROCHLORIDE 10 MG: 5 INJECTION INTRAVENOUS at 15:09

## 2019-06-02 RX ADMIN — Medication 10 ML: at 21:05

## 2019-06-02 RX ADMIN — DILTIAZEM HYDROCHLORIDE 10 MG/HR: 5 INJECTION INTRAVENOUS at 16:05

## 2019-06-02 RX ADMIN — METOPROLOL TARTRATE 12.5 MG: 25 TABLET ORAL at 09:44

## 2019-06-02 RX ADMIN — POTASSIUM CHLORIDE 10 MEQ: 10 TABLET, EXTENDED RELEASE ORAL at 09:44

## 2019-06-02 RX ADMIN — DILTIAZEM HYDROCHLORIDE 10 MG/HR: 5 INJECTION INTRAVENOUS at 23:45

## 2019-06-02 ASSESSMENT — PAIN SCALES - GENERAL
PAINLEVEL_OUTOF10: 0

## 2019-06-02 NOTE — PROGRESS NOTES
P Quality Flow/Interdisciplinary Rounds Progress Note        Quality Flow Rounds held on June 2, 2019    Disciplines Attending:  Bedside Nurse, ,  and Nursing Unit Leadership    Orquidea Noriega was admitted on 5/29/2019 11:36 PM    Anticipated Discharge Date:  Expected Discharge Date: 06/01/19    Disposition:    Randell Score:  Randell Scale Score: 17    Readmission Risk              Risk of Unplanned Readmission:        11           Discussed patient goal for the day, patient clinical progression, and barriers to discharge.   The following Goal(s) of the Day/Commitment(s) have been identified:  per pulm plan      Eldon Arguelles  June 2, 2019

## 2019-06-02 NOTE — PROGRESS NOTES
Date: 6/1/2019    Time: 9:13 PM    Patient Placed On BIPAP/CPAP/ Non-Invasive Ventilation? Yes    If no must comment. Facial area red/color change? No           If YES are Blister/Lesion present? No   If yes must notify nursing staff  BIPAP/CPAP skin barrier? Yes    Skin barrier type:Liquicell       Comments: Pt placed on AVAPS for the night. Liquicell in place.         Thomas Read      06/01/19 2112   NIV Type   Mode AVAPS   Mask Type Full face mask   Mask Size Large   Settings/Measurements   Comfort Level Good   Using Accessory Muscles No   EPAP 7 cmH2O   Vt Ordered 450 mL   Rate Ordered 16   Resp 18   SpO2 96   FiO2  35 %   Vt Exhaled 633 mL   Mask Leak (lpm) 46 lpm   Skin Protection for O2 Device Yes  (LIQUICELL)

## 2019-06-02 NOTE — PROGRESS NOTES
0.9   GLUCOSE 167* 133*   CALCIUM 8.7 9.1       Recent Labs     05/31/19  0220   WBC 10.9   RBC 5.86*   HGB 17.8*   HCT 54.0   MCV 92.2   MCH 30.4   MCHC 33.0   RDW 13.6      MPV 10.0       Radiology:   XR LUMBAR SPINE (2-3 VIEWS)   Final Result    Findings consistent with moderate degenerative disc disease and   degenerative facets disease of the lumbar spine. The exam has been dictated and signed by Erna Sen. Κ[place]ΤΩ Π[place], CNP. Cris Chisholm MD, Radiologist, have reviewed the images and   report and concur with these findings. XR THORACIC SPINE (2 VIEWS)   Final Result    Findings consistent with moderate degenerative disc disease and   degenerative facets disease of the cervical spine. The exam has been dictated and signed by Erna Sen. ΚΑΤΩ ΠΟΛΕΜΙ∆ΙΑ, CNP. Cris Chisholm MD, Radiologist, have reviewed the images and   report and concur with these findings. XR CHEST PORTABLE   Final Result      Cardiomegaly      No evidence of airspace consolidation or pulmonary venous congestion. Assessment:    Active Problems:    COPD exacerbation (HCC)  Resolved Problems:    * No resolved hospital problems. *      Plan:  1. Acute Hypoxic Respiratory Failure   - Pulm following, appreciate their recommendations   - Receiving Duonebs    2. HTN   - Stable, continue Metoprolol      3. Deconditioning   - PT score 11/24, Pt's family given choice list, has not made decision.      4. H/o Atrial Flutter with   - Cardiology states this was a single episode and no indication for anticoagulation. 5. Diastolic heart failure   - Cardiology following, appreciate their recommendations   - Echo completed. Currently on PO lasix     Dispo: Awaiting family choice and social work management for placement.      Electronically signed by Phill Weaver PA-C on 6/2/2019 at 8:56 AM

## 2019-06-02 NOTE — PROGRESS NOTES
Pulmonary Progress Note    Admit Date: 2019  Hospital day                               PCP: Yael Torrez DO    Chief Complaint (s):  Patient Active Problem List   Diagnosis    COPD exacerbation ([facility] [address]       Subjective:  · Awake and alert, slept with BiPAP again last night and did quite well. Vitals:  VITALS:  /61   Pulse 95   Temp 98 °F (36.7 °C) (Oral)   Resp 20   Ht 5' 8\" (1.727 m)   Wt (!) 357 lb 0.3 oz (161.9 kg)   SpO2 95%   BMI 54.28 kg/m²     24HR INTAKE/OUTPUT:      Intake/Output Summary (Last 24 hours) at 2019 1153  Last data filed at 2019 0437  Gross per 24 hour   Intake 120 ml   Output 400 ml   Net -280 ml       24HR PULSE OXIMETRY RANGE:    SpO2  Av.7 %  Min: 92 %  Max: 97 %    Medications:  IV:      Scheduled Meds:   ipratropium-albuterol  1 ampule Inhalation Q4H    potassium chloride  10 mEq Oral Daily with breakfast    furosemide  40 mg Oral Daily    metoprolol tartrate  12.5 mg Oral Daily    sodium chloride flush  10 mL Intravenous 2 times per day    enoxaparin  50 mg Subcutaneous BID       Diet:   DIET CARB CONTROL;     EXAM:  General: No distress. Alert. Eyes: PERRL. No sclera icterus. No conjunctival injection. ENT: No discharge. Pharynx clear. Neck: Trachea midline. Normal thyroid. Resp: No accessory muscle use. No rales. No wheezing. No rhonchi. CV: Regular rate. Regular rhythm. No murmur or rub. Abd: Non-tender. Non-distended. No masses. No organomegaly. Normal bowel sounds. Morbidly obese. Skin: Warm and dry. No nodule on exposed extremities. No rash on exposed extremities. Ext: No cyanosis, clubbing, edema  Lymph: No cervical LAD. No supraclavicular LAD. M/S: No cyanosis. No joint deformity. No clubbing. Neuro: Awake. Follows commands. Positive pupils/gag/corneals. Normal pain response.        Results:  CBC:   Recent Labs     19  0220   WBC 10.9   HGB 17.8*   HCT 54.0   MCV 92.2        BMP:   Recent Labs the patient's family as available. Please note that voice recognition technology was used in the preparation of this note and make therefore it may contain inadvertent transcription errors. Dianna Rangel M.D., F.C.C.P.     [facility], [address] [place] [address], [address] [place] [address]

## 2019-06-02 NOTE — PROGRESS NOTES
Patient's blood pressure running low on dynamap. Unable to hear blood pressure manually. Able to get blood pressure on left upper arm with dynamap of 95/70. Dr. Haven Richards notified. Patient is no apparent distress, not diaphoretic, not short of breath. Received order to give . 25mg of IV digoxin and to administer cardizem drip at 10ml/hr.  Will continue to monitor

## 2019-06-02 NOTE — PROGRESS NOTES
Call placed to Dr. Arlyn Raymond regarding patient going into [facility] rvr. New orders received.  Patient in no distress at this time

## 2019-06-03 LAB
ALBUMIN SERPL-MCNC: 3.6 G/DL (ref 3.5–5.2)
ALP BLD-CCNC: 39 U/L (ref 40–129)
ALT SERPL-CCNC: 47 U/L (ref 0–40)
ANION GAP SERPL CALCULATED.3IONS-SCNC: 10 MMOL/L (ref 7–16)
AST SERPL-CCNC: 31 U/L (ref 0–39)
BILIRUB SERPL-MCNC: 0.4 MG/DL (ref 0–1.2)
BUN BLDV-MCNC: 26 MG/DL (ref 8–23)
CALCIUM SERPL-MCNC: 8.4 MG/DL (ref 8.6–10.2)
CHLORIDE BLD-SCNC: 100 MMOL/L (ref 98–107)
CO2: 29 MMOL/L (ref 22–29)
CREAT SERPL-MCNC: 1 MG/DL (ref 0.7–1.2)
GFR AFRICAN AMERICAN: >60
GFR NON-AFRICAN AMERICAN: >60 ML/MIN/1.73
GLUCOSE BLD-MCNC: 93 MG/DL (ref 74–99)
POTASSIUM SERPL-SCNC: 4 MMOL/L (ref 3.5–5)
SODIUM BLD-SCNC: 139 MMOL/L (ref 132–146)
TOTAL PROTEIN: 6.1 G/DL (ref 6.4–8.3)

## 2019-06-03 PROCEDURE — 2060000000 HC ICU INTERMEDIATE R&B

## 2019-06-03 PROCEDURE — 2700000000 HC OXYGEN THERAPY PER DAY

## 2019-06-03 PROCEDURE — 6360000002 HC RX W HCPCS: Performed by: INTERNAL MEDICINE

## 2019-06-03 PROCEDURE — 36415 COLL VENOUS BLD VENIPUNCTURE: CPT

## 2019-06-03 PROCEDURE — 2580000003 HC RX 258: Performed by: PHYSICIAN ASSISTANT

## 2019-06-03 PROCEDURE — 6370000000 HC RX 637 (ALT 250 FOR IP): Performed by: INTERNAL MEDICINE

## 2019-06-03 PROCEDURE — 80053 COMPREHEN METABOLIC PANEL: CPT

## 2019-06-03 PROCEDURE — 97530 THERAPEUTIC ACTIVITIES: CPT

## 2019-06-03 PROCEDURE — 2580000003 HC RX 258: Performed by: INTERNAL MEDICINE

## 2019-06-03 PROCEDURE — 6370000000 HC RX 637 (ALT 250 FOR IP): Performed by: PHYSICIAN ASSISTANT

## 2019-06-03 PROCEDURE — 2500000003 HC RX 250 WO HCPCS: Performed by: INTERNAL MEDICINE

## 2019-06-03 PROCEDURE — 94660 CPAP INITIATION&MGMT: CPT

## 2019-06-03 RX ORDER — DIGOXIN 0.25 MG/ML
250 INJECTION INTRAMUSCULAR; INTRAVENOUS ONCE
Status: COMPLETED | OUTPATIENT
Start: 2019-06-03 | End: 2019-06-03

## 2019-06-03 RX ORDER — METOPROLOL TARTRATE 50 MG/1
50 TABLET, FILM COATED ORAL 2 TIMES DAILY
Status: DISCONTINUED | OUTPATIENT
Start: 2019-06-03 | End: 2019-06-04

## 2019-06-03 RX ORDER — DIGOXIN 250 MCG
250 TABLET ORAL DAILY
Status: DISCONTINUED | OUTPATIENT
Start: 2019-06-04 | End: 2019-06-04

## 2019-06-03 RX ADMIN — DIGOXIN 250 MCG: 0.25 INJECTION INTRAMUSCULAR; INTRAVENOUS at 07:13

## 2019-06-03 RX ADMIN — DILTIAZEM HYDROCHLORIDE 15 MG/HR: 5 INJECTION INTRAVENOUS at 17:19

## 2019-06-03 RX ADMIN — METOPROLOL TARTRATE 50 MG: 50 TABLET ORAL at 20:35

## 2019-06-03 RX ADMIN — Medication 10 ML: at 08:30

## 2019-06-03 RX ADMIN — ENOXAPARIN SODIUM 50 MG: 100 INJECTION SUBCUTANEOUS at 20:35

## 2019-06-03 RX ADMIN — POTASSIUM CHLORIDE 10 MEQ: 10 TABLET, EXTENDED RELEASE ORAL at 08:31

## 2019-06-03 RX ADMIN — ENOXAPARIN SODIUM 50 MG: 100 INJECTION SUBCUTANEOUS at 08:31

## 2019-06-03 RX ADMIN — DIGOXIN 250 MCG: 0.25 INJECTION INTRAMUSCULAR; INTRAVENOUS at 10:42

## 2019-06-03 RX ADMIN — DILTIAZEM HYDROCHLORIDE 15 MG/HR: 5 INJECTION INTRAVENOUS at 08:39

## 2019-06-03 RX ADMIN — FUROSEMIDE 40 MG: 40 TABLET ORAL at 06:13

## 2019-06-03 RX ADMIN — DILTIAZEM HYDROCHLORIDE 10 MG/HR: 5 INJECTION INTRAVENOUS at 23:53

## 2019-06-03 ASSESSMENT — PAIN SCALES - GENERAL
PAINLEVEL_OUTOF10: 0
PAINLEVEL_OUTOF10: 0

## 2019-06-03 NOTE — PROGRESS NOTES
Physical Therapy    Facility/Department: [facility] MED SURG  Treatment note    NAME: Conway Carrel  : 1950  MRN: [id number]    Date of Service: 6/3/2019               Patient Diagnosis(es): The primary encounter diagnosis was COPD exacerbation ([facility] [address] Diagnoses of Bronchitis and Hypoxia were also pertinent to this visit. has a past medical history of Atrial flutter ([facility] [address] and CHF (congestive heart failure) ([facility] [address] has no past surgical history on file. Evaluating Therapist: Dileep Lennon PT         Room #: 431   DIAGNOSIS:  COPD exacerbation   PRECAUTIONS: falls     Social:  Pt lives with wife  in a  1  floor apartment no  steps  to enter. Prior to admission: pt reports he has not been walking. Independent with transfers per pt      Initial Evaluation  Date:  19 Treatment  6/3/19    Short Term/ Long Term   Goals   Was pt agreeable to Eval/treatment? yes  yes    Does pt have pain?  chronic back pain  Back pain with standing    Bed Mobility  Rolling:  SBA   Supine to sit: max assist   Sit to supine: SBA   Scooting: SBA in sit . Mod assist in supine and repositioning  Rolling: SBA  Supine to sit: Mod A  Sit to supine: SBA  Scooting: Min A up EOB seated  SBA    Transfers Sit to stand: mod assist   Stand to sit: min assist   Stand pivot:  NT  Sit to stand: Mod A  Stand to sit: Mod A  Stand Pivot: Attempted SBA    Ambulation   unable  unable 10  feet with  ww  with  Min assist    LE ROM  AAROM WFL      LE strength  3- to 4-/ 5       AM- PAC RAW score  11/ 24  10/24          Balance: fair sitting EOB, poor with brief standing    Pt performed therapeutic exercise of the following: NT    Patient education  Pt was educated on LE participation and UE usage to assist with transfers    Patient response to education:   Pt verbalized understanding Pt demonstrated skill Pt requires further education in this area   yes With repeated instruction yes     Additional Comments: Nurse ok with RxJoseph Johnson 22 chair found with

## 2019-06-03 NOTE — CARE COORDINATION
Social work / Discharge Planning:      Patient's wife called back and the option of LTAC was discussed. Choices provided and she prefers [facility]. Referral made to the liaison and the patient has criteria. No precert needed. RN updated. N-17 initiated.   Electronically signed by ARCHANA Mccord on 6/3/2019 at 3:59 PM

## 2019-06-03 NOTE — PROGRESS NOTES
[facility] Quality Flow/Interdisciplinary Rounds Progress Note        Quality Flow Rounds held on Latricia 3, 2019    Disciplines Attending:  Bedside Nurse, ,  and Nursing Unit Leadership    Jeannie Reeder was admitted on 5/29/2019 11:36 PM    Anticipated Discharge Date:  Expected Discharge Date: 06/01/19    Disposition:    Randell Score:  Randell Scale Score: 16    Readmission Risk              Risk of Unplanned Readmission:        12           Discussed patient goal for the day, patient clinical progression, and barriers to discharge. The following Goal(s) of the Day/Commitment(s) have been identified:  Wean Cardizem gtt, check Cardiology plan.       Sherrie Yi  Latricia 3, 2019

## 2019-06-03 NOTE — PROGRESS NOTES
furosemide  40 mg Oral Daily    sodium chloride flush  10 mL Intravenous 2 times per day    enoxaparin  50 mg Subcutaneous BID     Continuous Infusions:   diltiazem (CARDIZEM) 100 mg in 100 mL infusion 15 mg/hr (06/03/19 0839)     PRN Meds:.acetaminophen, sodium chloride flush, magnesium hydroxide, ondansetron    Allergies: Patient has no known allergies. Labs: No results for input(s): WBC, HGB, HCT, MCV, PLT in the last 72 hours. Labs:   Recent Labs     06/01/19  1135 06/02/19  1020 06/03/19  0453    142 139   K 4.1 3.5 4.0   CL 98 98 100   CO2 31* 34* 29   BUN 24* 25* 26*   CREATININE 0.9 0.9 1.0       Labs: No results for input(s): CKTOTAL, CKMB, CKMBINDEX, TROPONINI in the last 72 hours. Labs: No results for input(s): BNP in the last 72 hours. Labs: No results for input(s): CHOL, HDL, TRIG in the last 72 hours. Invalid input(s): Raymond Right    Labs:   Recent Labs     06/01/19  1135 06/02/19  1020 06/03/19  0453   PROT 7.2 7.0 6.1*       Review of systems: No reported significant weight gain or weight loss. no dysuria or frequency, no dizziness, falls or trauma, no change in bowel or bladder habits, no hematochezia, hemoptysis or hematuria. No fevers, chills, nausea or vomiting reported. No significant wheezing or sputum production. No headache or visual changes. The remainder of the 10 review of systems otherwise negative. Exam      General: Patient comfortable in no distress and currently denies any chest pain. HEENT: Face symmetrical and no apparent cranial nerve deficit. Neck: No jugular venous distention, carotid bruit or thyromegaly. Lungs: Clear bilaterally without rales, wheezes or dullness. Cardiac: IRegular rate and rhythm, no S3, S4, no rub or gallop. Abdomen: No rebound or guarding, no hepatosplenomegaly. Extremities: Without significant clubbing , cyanosis, or edema.      Neuro:  No focal motor or sensory deficit apparent. Skin: No petechiae, no significant bruising. Assessment: See plan below        Plan: #1 atrial fibrillation with rapid ventricular rate. Has increase metoprolol to 50 kg twice a day and not much blood pressure work with soap continues on low-dose IV Cardizem infusion and will give IV digoxin loading and p.o. digoxin and this will not lower the blood pressure. Echocardiogram done June 1, 2019 shows normal LVEF 60% and no significant valve abnormality. If he does not convert back to normal sinus rhythm in the next 24 hours would start eliquis 5 mg twice a day for anticoagulation. Keep potassium 4 greater and magnesium 2 or greater. Today potassium 4.0. #2 obstructive sleep apnea and increase his risk of recurrence of atrial fibrillation. #3 morbid obesity weighing about 350 pounds and I discussed with him weight loss. #4 shortness of breath likely some degree of diastolic dysfunction. Currently receiving Lasix 40 mg p.o. daily. BNP on admission minimally elevated at 402. Admission chest x-ray without pulmonary edema. Follow up telemetry heart rate.             Electronically signed by Felipe Herman MD on 6/3/2019 at 10:28 AM

## 2019-06-03 NOTE — CARE COORDINATION
Social work / Discharge Planning:       Social work noted Dr. Ceja Rather note recommending LTAC. Patient currently has LTAC criteria. Social work left a message for the patient's wife to discuss options. Awaiting return call.   Electronically signed by ARCHANA Moore on 6/3/2019 at 1:22 PM

## 2019-06-03 NOTE — PROGRESS NOTES
Pulmonary Progress Note    Admit Date: 2019  Hospital day                               PCP: Beto Kam DO    Chief Complaint (s):  Patient Active Problem List   Diagnosis    COPD exacerbation ([address]       Subjective:  Lucila Borrero is awake and alert this morning upset that a newspaper now $0.75. He is resting well on AVAPS and is staying awake easily during the day. Vitals:  VITALS:  /83   Pulse 80   Temp 97.8 °F (36.6 °C) (Oral)   Resp 18   Ht 5' 8\" (1.727 m)   Wt (!) 357 lb 0.3 oz (161.9 kg)   SpO2 94%   BMI 54.28 kg/m²     24HR INTAKE/OUTPUT:      Intake/Output Summary (Last 24 hours) at 6/3/2019 075  Last data filed at 6/3/2019 0514  Gross per 24 hour   Intake --   Output 500 ml   Net -500 ml       24HR PULSE OXIMETRY RANGE:    SpO2  Av.3 %  Min: 94 %  Max: 95 %    Medications:  IV:   diltiazem (CARDIZEM) 100 mg in 100 mL infusion 10 mg/hr (19 7396)       Scheduled Meds:   metoprolol tartrate  50 mg Oral BID    potassium chloride  10 mEq Oral Daily with breakfast    furosemide  40 mg Oral Daily    sodium chloride flush  10 mL Intravenous 2 times per day    enoxaparin  50 mg Subcutaneous BID       Diet:   DIET CARB CONTROL;     EXAM:  General: No distress. Alert. Eyes: PERRL. No sclera icterus. No conjunctival injection. ENT: No discharge. Pharynx clear. Neck: Trachea midline. Normal thyroid. Resp: No accessory muscle use. No rales. No wheezing. No rhonchi. CV: Regular rate. Regular rhythm. No murmur or rub. Abd: Non-tender. Non-distended. No masses. No organomegaly. Normal bowel sounds. Morbidly obese. Skin: Warm and dry. No nodule on exposed extremities. No rash on exposed extremities. Ext: No cyanosis, clubbing, edema  Lymph: No cervical LAD. No supraclavicular LAD. M/S: No cyanosis. No joint deformity. No clubbing. Neuro: Awake. Follows commands. Positive pupils/gag/corneals. Normal pain response.        Results:  CBC:   No results for bedtime and with naps. 3. Consider LTAC referral        Care reviewed with the staff and the patient's family as available. Please note that voice recognition technology was used in the preparation of this note and make therefore it may contain inadvertent transcription errors. Ana Kurtz M.D., F.C.C.P.     [facility] [address] [facility], [address] [place] [address], [address] [place] [address]

## 2019-06-04 LAB
ALBUMIN SERPL-MCNC: 3.9 G/DL (ref 3.5–5.2)
ALP BLD-CCNC: 48 U/L (ref 40–129)
ALT SERPL-CCNC: 79 U/L (ref 0–40)
ANION GAP SERPL CALCULATED.3IONS-SCNC: 10 MMOL/L (ref 7–16)
AST SERPL-CCNC: 48 U/L (ref 0–39)
BILIRUB SERPL-MCNC: 0.8 MG/DL (ref 0–1.2)
BLOOD CULTURE, ROUTINE: NORMAL
BUN BLDV-MCNC: 21 MG/DL (ref 8–23)
CALCIUM SERPL-MCNC: 8.5 MG/DL (ref 8.6–10.2)
CHLORIDE BLD-SCNC: 100 MMOL/L (ref 98–107)
CO2: 27 MMOL/L (ref 22–29)
CREAT SERPL-MCNC: 1 MG/DL (ref 0.7–1.2)
CULTURE, BLOOD 2: NORMAL
EKG ATRIAL RATE: 163 BPM
EKG Q-T INTERVAL: 308 MS
EKG QRS DURATION: 94 MS
EKG QTC CALCULATION (BAZETT): 465 MS
EKG R AXIS: -55 DEGREES
EKG T AXIS: 74 DEGREES
EKG VENTRICULAR RATE: 137 BPM
GFR AFRICAN AMERICAN: >60
GFR NON-AFRICAN AMERICAN: >60 ML/MIN/1.73
GLUCOSE BLD-MCNC: 150 MG/DL (ref 74–99)
POTASSIUM SERPL-SCNC: 4.2 MMOL/L (ref 3.5–5)
SODIUM BLD-SCNC: 137 MMOL/L (ref 132–146)
TOTAL PROTEIN: 7.1 G/DL (ref 6.4–8.3)

## 2019-06-04 PROCEDURE — 6360000002 HC RX W HCPCS: Performed by: INTERNAL MEDICINE

## 2019-06-04 PROCEDURE — 2700000000 HC OXYGEN THERAPY PER DAY

## 2019-06-04 PROCEDURE — 2060000000 HC ICU INTERMEDIATE R&B

## 2019-06-04 PROCEDURE — 36415 COLL VENOUS BLD VENIPUNCTURE: CPT

## 2019-06-04 PROCEDURE — 99232 SBSQ HOSP IP/OBS MODERATE 35: CPT | Performed by: INTERNAL MEDICINE

## 2019-06-04 PROCEDURE — 6370000000 HC RX 637 (ALT 250 FOR IP): Performed by: INTERNAL MEDICINE

## 2019-06-04 PROCEDURE — 80053 COMPREHEN METABOLIC PANEL: CPT

## 2019-06-04 PROCEDURE — 97530 THERAPEUTIC ACTIVITIES: CPT

## 2019-06-04 PROCEDURE — 6370000000 HC RX 637 (ALT 250 FOR IP): Performed by: PHYSICIAN ASSISTANT

## 2019-06-04 PROCEDURE — 2580000003 HC RX 258: Performed by: PHYSICIAN ASSISTANT

## 2019-06-04 PROCEDURE — 94660 CPAP INITIATION&MGMT: CPT

## 2019-06-04 PROCEDURE — 97535 SELF CARE MNGMENT TRAINING: CPT

## 2019-06-04 RX ORDER — ACETAMINOPHEN 325 MG/1
650 TABLET ORAL EVERY 4 HOURS PRN
Status: CANCELLED | OUTPATIENT
Start: 2019-06-04

## 2019-06-04 RX ORDER — FUROSEMIDE 40 MG/1
40 TABLET ORAL DAILY
Status: CANCELLED | OUTPATIENT
Start: 2019-06-05

## 2019-06-04 RX ORDER — ONDANSETRON 2 MG/ML
4 INJECTION INTRAMUSCULAR; INTRAVENOUS EVERY 6 HOURS PRN
Qty: 84 ML | DISCHARGE
Start: 2019-06-04

## 2019-06-04 RX ORDER — SODIUM CHLORIDE 0.9 % (FLUSH) 0.9 %
10 SYRINGE (ML) INJECTION EVERY 12 HOURS SCHEDULED
Status: CANCELLED | OUTPATIENT
Start: 2019-06-04

## 2019-06-04 RX ORDER — METOPROLOL TARTRATE 50 MG/1
50 TABLET, FILM COATED ORAL 2 TIMES DAILY
Qty: 60 TABLET | Refills: 3 | DISCHARGE
Start: 2019-06-04 | End: 2019-06-05 | Stop reason: HOSPADM

## 2019-06-04 RX ORDER — METOPROLOL TARTRATE 50 MG/1
50 TABLET, FILM COATED ORAL 2 TIMES DAILY
Status: CANCELLED | OUTPATIENT
Start: 2019-06-04

## 2019-06-04 RX ORDER — DIGOXIN 0.25 MG/ML
250 INJECTION INTRAMUSCULAR; INTRAVENOUS ONCE
Status: COMPLETED | OUTPATIENT
Start: 2019-06-04 | End: 2019-06-04

## 2019-06-04 RX ORDER — POTASSIUM CHLORIDE 750 MG/1
10 TABLET, EXTENDED RELEASE ORAL
Status: CANCELLED | OUTPATIENT
Start: 2019-06-05

## 2019-06-04 RX ORDER — DIGOXIN 125 MCG
125 TABLET ORAL DAILY
Status: DISCONTINUED | OUTPATIENT
Start: 2019-06-05 | End: 2019-06-06 | Stop reason: HOSPADM

## 2019-06-04 RX ORDER — ACETAMINOPHEN 325 MG/1
650 TABLET ORAL EVERY 4 HOURS PRN
Qty: 120 TABLET | Refills: 3 | DISCHARGE
Start: 2019-06-04

## 2019-06-04 RX ORDER — POTASSIUM CHLORIDE 750 MG/1
10 TABLET, EXTENDED RELEASE ORAL
Qty: 60 TABLET | Refills: 3 | Status: ON HOLD | DISCHARGE
Start: 2019-06-05 | End: 2019-06-29 | Stop reason: HOSPADM

## 2019-06-04 RX ORDER — ONDANSETRON 2 MG/ML
4 INJECTION INTRAMUSCULAR; INTRAVENOUS EVERY 6 HOURS PRN
Status: CANCELLED | OUTPATIENT
Start: 2019-06-04

## 2019-06-04 RX ORDER — SODIUM CHLORIDE 0.9 % (FLUSH) 0.9 %
10 SYRINGE (ML) INJECTION PRN
Status: CANCELLED | OUTPATIENT
Start: 2019-06-04

## 2019-06-04 RX ORDER — FUROSEMIDE 40 MG/1
40 TABLET ORAL DAILY
Status: ON HOLD | DISCHARGE
Start: 2019-06-04 | End: 2019-06-29 | Stop reason: HOSPADM

## 2019-06-04 RX ORDER — DIGOXIN 125 MCG
125 TABLET ORAL DAILY
Qty: 30 TABLET | Refills: 3 | DISCHARGE
Start: 2019-06-05

## 2019-06-04 RX ORDER — DIGOXIN 125 MCG
125 TABLET ORAL DAILY
Status: CANCELLED | OUTPATIENT
Start: 2019-06-05

## 2019-06-04 RX ORDER — METOPROLOL TARTRATE 50 MG/1
100 TABLET, FILM COATED ORAL 2 TIMES DAILY
Status: DISCONTINUED | OUTPATIENT
Start: 2019-06-04 | End: 2019-06-06 | Stop reason: HOSPADM

## 2019-06-04 RX ORDER — METOPROLOL TARTRATE 50 MG/1
50 TABLET, FILM COATED ORAL ONCE
Status: COMPLETED | OUTPATIENT
Start: 2019-06-04 | End: 2019-06-04

## 2019-06-04 RX ADMIN — APIXABAN 5 MG: 5 TABLET, FILM COATED ORAL at 20:11

## 2019-06-04 RX ADMIN — APIXABAN 5 MG: 5 TABLET, FILM COATED ORAL at 11:44

## 2019-06-04 RX ADMIN — METOPROLOL TARTRATE 100 MG: 50 TABLET ORAL at 20:11

## 2019-06-04 RX ADMIN — DIGOXIN 250 MCG: 0.25 INJECTION INTRAMUSCULAR; INTRAVENOUS at 17:45

## 2019-06-04 RX ADMIN — METOPROLOL TARTRATE 50 MG: 50 TABLET ORAL at 16:03

## 2019-06-04 RX ADMIN — ENOXAPARIN SODIUM 50 MG: 100 INJECTION SUBCUTANEOUS at 07:56

## 2019-06-04 RX ADMIN — Medication 10 ML: at 17:23

## 2019-06-04 RX ADMIN — FUROSEMIDE 40 MG: 40 TABLET ORAL at 06:08

## 2019-06-04 RX ADMIN — DIGOXIN 250 MCG: 250 TABLET ORAL at 08:01

## 2019-06-04 RX ADMIN — Medication 10 ML: at 20:11

## 2019-06-04 RX ADMIN — POTASSIUM CHLORIDE 10 MEQ: 10 TABLET, EXTENDED RELEASE ORAL at 07:55

## 2019-06-04 RX ADMIN — METOPROLOL TARTRATE 50 MG: 50 TABLET ORAL at 07:55

## 2019-06-04 ASSESSMENT — PAIN SCALES - GENERAL
PAINLEVEL_OUTOF10: 0
PAINLEVEL_OUTOF10: 0

## 2019-06-04 NOTE — PLAN OF CARE
Problem: Falls - Risk of:  Goal: Will remain free from falls  Description  Will remain free from falls  6/4/2019 1116 by Dena Jefferson RN  Outcome: Ongoing  6/4/2019 0234 by Hiren Hare RN  Outcome: Met This Shift  Goal: Absence of physical injury  Description  Absence of physical injury  6/4/2019 1116 by Dena Jefferson RN  Outcome: Ongoing  6/4/2019 0234 by Hiren Hare RN  Outcome: Met This Shift

## 2019-06-04 NOTE — PROGRESS NOTES
Physical Therapy    Facility/Department: [facility] MED SURG  Treatment note    NAME: Vira Adair  : 1950  MRN: [id number]    Date of Service: 2019               Patient Diagnosis(es): The primary encounter diagnosis was COPD exacerbation ([facility] [address] Diagnoses of Bronchitis and Hypoxia were also pertinent to this visit. has a past medical history of Atrial flutter ([facility] [address] and CHF (congestive heart failure) ([facility] [address] [facility] has no past surgical history on file. Evaluating Therapist: Jay Horton PT         Room #: 660   DIAGNOSIS:  COPD exacerbation   PRECAUTIONS: falls     Social:  Pt lives with wife  in a  1  floor apartment no  steps  to enter. Prior to admission: pt reports he has not been walking. Independent with transfers per pt      Initial Evaluation  Date:  19 Treatment  19    Short Term/ Long Term   Goals   Was pt agreeable to Eval/treatment? yes  yes    Does pt have pain?  chronic back pain  Back pain with standing    Bed Mobility  Rolling:  SBA   Supine to sit: max assist   Sit to supine: SBA   Scooting: SBA in sit . Mod assist in supine and repositioning  Rolling: SBA  Supine to sit: SBA with rail for support  Sit to supine: NT  Scooting: Min A up EOB seated  SBA    Transfers Sit to stand: mod assist   Stand to sit: min assist   Stand pivot:  NT  Sit to stand: Mod/Min A  Stand to sit: Mod A  Stand Pivot:  Mod A using bariatric WW for support SBA    Ambulation   unable  NT 10  feet with  ww  with  Min assist    LE ROM  AAROM WFL      LE strength  3- to 4-/ 5       AM- PAC RAW score            Balance: fair sitting EOB, poor during transfer using bariatric [employer] for support    Pt performed therapeutic exercise of the following: NT    Patient education  Pt was educated on LE participation and UE usage to assist with transfers, upright posture while pivoting with [employer]    Patient response to education:   Pt verbalized understanding Pt demonstrated skill Pt requires further education in this area   yes With repeated instruction yes     Additional Comments: Nurse ok with Rx. Pt assisted to EOB, sat EOB SBA. Pt given instruction/demo for use of bariatric WW to pivot bed to chair, states willing to try. Pt performed pivot, able to WB and advance LEs but with poor posture. Once in the upright recliner chair, Pt states needs to have a BM. No bedside commode available, Pt then placed seated on a bedpan, was able to perform sit to stand from the chair Min A with use of armrests. Pt re approached later in AM when finished on the bedpan, stood using [employer] for support 40 seconds with Min A for balance while receiving hygiene care. Pt fatigued after activity  Pt was left upright in a recliner chair with call light in reach, waffle cushion placed    Treatment time: 25 minutes  Time out: 0950    Pt is making good progress toward established Physical Therapy goals as per ability to transfer bed to chair this session. Continue with physical therapy current plan of care.     Baldomero Tavares PTA   License Number: [id number]

## 2019-06-04 NOTE — PROGRESS NOTES
CARDIOLOGY PROGRESS NOTE  [facility]        Subjective:  [address] [employer] cardiology seeing patient for atrial fibrillation with rapid ventricular rate but significantly improved today with resting heart rate in the 70s. Morbidly obese with sleep apnea, shortness of breath likely with some degree of diastolic dysfunction. Today sitting in a chair at bedside and appears to be comfortable in no distress. Denies any chest pain and reports he didn't like his breakfast and complaining about the food. Objective: Labs chart medications and telemetry all reviewed. Patient Vitals for the past 24 hrs:   BP Temp Temp src Pulse Resp SpO2 Weight   06/04/19 0745 115/86 97.7 °F (36.5 °C) Axillary 93 20 94 % --   06/04/19 0607 119/82 -- -- 70 -- -- --   06/04/19 0229 (!) 141/81 -- -- 69 -- -- --   06/04/19 0038 -- -- -- -- 22 -- --   06/03/19 2259 124/78 98.4 °F (36.9 °C) Axillary 70 18 96 % --   06/03/19 2250 -- -- -- -- 20 -- --   06/03/19 2035 114/63 -- -- 72 -- -- --   06/03/19 1958 -- -- -- -- -- 93 % --   06/03/19 1740 -- -- -- 92 -- -- --   06/03/19 1715 -- -- -- 140 -- -- --   06/03/19 1644 (!) 101/54 97.6 °F (36.4 °C) Oral 130 16 -- --   06/03/19 1445 94/62 -- -- 90 -- -- --   06/03/19 1145 92/60 -- -- -- -- -- --   06/03/19 1045 -- -- -- 91 -- -- --         Intake/Output Summary (Last 24 hours) at 6/4/2019 1021  Last data filed at 6/4/2019 0752  Gross per 24 hour   Intake 720 ml   Output 2225 ml   Net -1505 ml       Wt Readings from Last 3 Encounters:   05/16/17 (!) 360 lb 3.2 oz (163.4 kg)   05/02/17 (!) 357 lb 12.8 oz (162.3 kg)       Telemetry: I personally reviewed and shows atrial flutter heart rate in the 70s.     Current meds: Scheduled Meds:   metoprolol tartrate  50 mg Oral BID    digoxin  250 mcg Oral Daily    potassium chloride  10 mEq Oral Daily with breakfast    furosemide  40 mg Oral Daily    sodium chloride flush  10 mL Intravenous 2 times per day    enoxaparin  50 mg Subcutaneous BID Continuous Infusions:  PRN Meds:.acetaminophen, sodium chloride flush, magnesium hydroxide, ondansetron    Allergies: Patient has no known allergies. Labs: No results for input(s): WBC, HGB, HCT, MCV, PLT in the last 72 hours. Labs:   Recent Labs     06/02/19  1020 06/03/19  0453 06/04/19  0930    139 137   K 3.5 4.0 4.2   CL 98 100 100   CO2 34* 29 27   BUN 25* 26* 21   CREATININE 0.9 1.0 1.0       Labs: No results for input(s): CKTOTAL, CKMB, CKMBINDEX, TROPONINI in the last 72 hours. Labs: No results for input(s): BNP in the last 72 hours. Labs: No results for input(s): CHOL, HDL, TRIG in the last 72 hours. Invalid input(s): Maye Gayle    Labs:   Recent Labs     06/02/19  1020 06/03/19  0453 06/04/19  0930   PROT 7.0 6.1* 7.1       Review of systems: No reported significant weight gain or weight loss. no dysuria or frequency, no dizziness, falls or trauma, no change in bowel or bladder habits, no hematochezia, hemoptysis or hematuria. No fevers, chills, nausea or vomiting reported. No significant wheezing or sputum production. No headache or visual changes. The remainder of the 10 review of systems otherwise negative. Exam      General: Patient comfortable in no distress and currently denies any chest pain. HEENT: Face symmetrical and no apparent cranial nerve deficit. Neck: No jugular venous distention, carotid bruit or thyromegaly. Lungs: Clear bilaterally without rales, wheezes or dullness. Cardiac: IRegular rate and rhythm, no S3, S4, no rub or gallop. Abdomen: No rebound or guarding, no hepatosplenomegaly. Extremities: Without significant clubbing , cyanosis, or edema. Neuro:  No focal motor or sensory deficit apparent. Skin: No petechiae, no significant bruising. Assessment: See plan below        Plan: #1 atrial relation/flutter with rapid rate at times. Now well controlled on current medical regimen.   Continue digoxin 250 µg daily, metoprolol 50 mg twice a day. Not much blood pressure work with. Start eliquis 5 mg twice a day as he persists in atrial fibrillation today. Have weaned off diltiazem infusion. #2 chronic anticoagulation and no apparent contraindication as he denies any bleeding or falls. Has been on anticoagulation 2 years ago when he had atrial fibrillation and was cardioverted at that time. #3 morbid obesity and discussed weight reduction and caloric restriction. #4 obstructive sleep apnea and encouraged him to continue to wear CPAP/BiPAP at night and to be compliant with it. He seems to understand necessity this. Plan discharge to facility later today from cardiology viewpoint and follow-up outpatient my office [facility] cardiology in one month.         Electronically signed by Rebecca Ricardo MD on 6/4/2019 at 10:21 AM

## 2019-06-04 NOTE — CARE COORDINATION
Social work / Discharge Planning:        Transport cancelled for [facility]. Social work continues to follow with CM.   Electronically signed by ARCHANA Huitron on 6/4/2019 at 2:50 PM

## 2019-06-04 NOTE — PLAN OF CARE
Problem: Falls - Risk of:  Goal: Will remain free from falls  Description  Will remain free from falls  Outcome: Met This Shift  Goal: Absence of physical injury  Description  Absence of physical injury  Outcome: Met This Shift     Problem: Breathing Pattern - Ineffective:  Goal: Ability to achieve and maintain a regular respiratory rate will improve  Description  Ability to achieve and maintain a regular respiratory rate will improve  Outcome: Met This Shift     Problem: Airway Clearance - Ineffective:  Goal: Ability to maintain a clear airway will improve  Description  Ability to maintain a clear airway will improve  Outcome: Met This Shift     Problem: Gas Exchange - Impaired:  Goal: Levels of oxygenation will improve  Description  Levels of oxygenation will improve  Outcome: Met This Shift

## 2019-06-04 NOTE — PROGRESS NOTES
Occupational Therapy  OT BEDSIDE TREATMENT NOTE      Date:2019  Patient Name: Maddison Bucio  MRN: [id number]  : 1950  Room: [address] [place] [address]     Evaluating OT: Trace Robles OTR/L [id number]     AM-PAC Daily Activity Raw Score:      Recommended Adaptive Equipment: TBD     Diagnosis: COPD exacerbation. Pt presents to ED with SOB from home.      Pertinent Medical History: atrial flutter, CHF   Precautions:  Falls, O2     Home Living: Pt lives with wife in a single story apt with no steps on entry. Bathroom setup: use of BSC next to bed/chair. Sponge bathes bed level      Prior Level of Function: wife assists with ADLs, wife manages all IADLs; completed functional mobility with no AD for SPT only per pt report in the last year  Driving: No.     Pt reports he completes urination with HH urinal, BSC for toileting     Pain Level: Back- Moderate      Cognition: A&O: .              Problem solving:  fair              Judgement/safety:  fair     Functional Assessment:    Initial Eval Status  Date: 19 Treatment session: 19  Short Term Goals  Treatment frequency: 2-5x/wk PRN x1-3 wks   Feeding Independent       Grooming Min A   Set up   UB Dressing Mod A  Donning/doffing hospital gown  Min A to doff/ don gown Set up   LB Dressing Dependent  Donning B socks  Mod A    Bathing Max A   Min A   Toileting Max A  Max A.   UE maintained supported on ww while standing to avoid LOB. Min A   Bed Mobility  Supine to sit: Max A  Sit to Supine: SBA with poor safety  Scooting: SBA with multiple rest breaks due to SOB and fatigue Supine to sit: SBA. Extended time required   Min A   Functional Transfers STS: Mod A STS: Mod A from EOB to chair using bariatric ww SBA   Functional Mobility NT unable to advance foot in standing at EOB with [employer]  Mod A during ADLs   Balance Sitting: initially fair minus, upon sitting several minutes able to achieve fair balance     Standing: poor plus at [employer]  Sitting: SBA     Standing:  Mod A at ww     Activity Tolerance Poor. Frequent rest breaks between minimal periods of exertion Poor reporting fatigue during tasks standing delaney x3-4 min with fair minus balance during self care tasks           B UE were [facility] during tasks. Comments: Upon arrival pt was supine in bed. Pt became emotional post SPT to chair stating \"that's a huge stepping stone. \" At end of session pt was transferred to chair with B LE elevated, alarm on and all lines and tubes intact, call light within reach. Education: AD placement during transfers and importance of correct posture when standing during ADLs to prevent falls. · Pt has made good progress towards set goals.    · Continue with current plan of care      Total Tx Time: Artsaaraceli 19 BATES/L [id number]

## 2019-06-04 NOTE — PROGRESS NOTES
family as available. Please note that voice recognition technology was used in the preparation of this note and make therefore it may contain inadvertent transcription errors. Arleth Young M.D., F.C.C.P.     [facility], [address] [place] [address], [address] [place] [address]

## 2019-06-04 NOTE — PROGRESS NOTES
Date: 6/3/2019    Time: 10:51 PM    Patient Placed On BIPAP/CPAP/ Non-Invasive Ventilation? Yes    If no must comment. Facial area red/color change? No           If YES are Blister/Lesion present? No   If yes must notify nursing staff  BIPAP/CPAP skin barrier?   Yes    Skin barrier type:Liquicel       Comments:        Shayla Perez

## 2019-06-04 NOTE — PROGRESS NOTES
Date: 6/4/2019    Time: 12:55 AM    Patient Placed On BIPAP/CPAP/ Non-Invasive Ventilation? No    If no must comment. Facial area red/color change? No           If YES are Blister/Lesion present? No   If yes must notify nursing staff  BIPAP/CPAP skin barrier?   Yes    Skin barrier type:Liquicel       Comments:        James Blunt

## 2019-06-04 NOTE — PROGRESS NOTES
Dr. Noris Thomas called via [phone] per Charge RN request regarding  sustaining 20 minutes / Jarred Anaya 6/4/19 2:52 PM

## 2019-06-04 NOTE — CARE COORDINATION
Social work / Discharge Planning:       Patient will discharge to [facility] via [facility]. Social work updated RN, facility liaison and patient's wife José Abebe.   Electronically signed by ARCHANA Moore on 6/4/2019 at 12:59 PM

## 2019-06-04 NOTE — PROGRESS NOTES
Report called to [facility], spoke with Brina Tracey, charge RN. All pertinent information provided during nurse to nurse report.

## 2019-06-04 NOTE — CARE COORDINATION
CM received call from Jackie @ [facility]. LTAC unable to accept pt at this time. Will continue to follow.

## 2019-06-05 LAB
ALBUMIN SERPL-MCNC: 3.6 G/DL (ref 3.5–5.2)
ALP BLD-CCNC: 44 U/L (ref 40–129)
ALT SERPL-CCNC: 78 U/L (ref 0–40)
ANION GAP SERPL CALCULATED.3IONS-SCNC: 11 MMOL/L (ref 7–16)
AST SERPL-CCNC: 40 U/L (ref 0–39)
BILIRUB SERPL-MCNC: 0.6 MG/DL (ref 0–1.2)
BUN BLDV-MCNC: 22 MG/DL (ref 8–23)
CALCIUM SERPL-MCNC: 8.5 MG/DL (ref 8.6–10.2)
CHLORIDE BLD-SCNC: 100 MMOL/L (ref 98–107)
CO2: 29 MMOL/L (ref 22–29)
CREAT SERPL-MCNC: 0.9 MG/DL (ref 0.7–1.2)
GFR AFRICAN AMERICAN: >60
GFR NON-AFRICAN AMERICAN: >60 ML/MIN/1.73
GLUCOSE BLD-MCNC: 104 MG/DL (ref 74–99)
POTASSIUM SERPL-SCNC: 4.1 MMOL/L (ref 3.5–5)
SODIUM BLD-SCNC: 140 MMOL/L (ref 132–146)
TOTAL PROTEIN: 6.4 G/DL (ref 6.4–8.3)

## 2019-06-05 PROCEDURE — 6370000000 HC RX 637 (ALT 250 FOR IP): Performed by: INTERNAL MEDICINE

## 2019-06-05 PROCEDURE — 2060000000 HC ICU INTERMEDIATE R&B

## 2019-06-05 PROCEDURE — 2580000003 HC RX 258: Performed by: PHYSICIAN ASSISTANT

## 2019-06-05 PROCEDURE — 36415 COLL VENOUS BLD VENIPUNCTURE: CPT

## 2019-06-05 PROCEDURE — 2700000000 HC OXYGEN THERAPY PER DAY

## 2019-06-05 PROCEDURE — 97530 THERAPEUTIC ACTIVITIES: CPT

## 2019-06-05 PROCEDURE — 94660 CPAP INITIATION&MGMT: CPT

## 2019-06-05 PROCEDURE — 99232 SBSQ HOSP IP/OBS MODERATE 35: CPT | Performed by: HOSPITALIST

## 2019-06-05 PROCEDURE — 97110 THERAPEUTIC EXERCISES: CPT

## 2019-06-05 PROCEDURE — 80053 COMPREHEN METABOLIC PANEL: CPT

## 2019-06-05 PROCEDURE — 6370000000 HC RX 637 (ALT 250 FOR IP): Performed by: PHYSICIAN ASSISTANT

## 2019-06-05 RX ORDER — METOPROLOL TARTRATE 100 MG/1
100 TABLET ORAL 2 TIMES DAILY
Qty: 60 TABLET | Refills: 3 | DISCHARGE
Start: 2019-06-05

## 2019-06-05 RX ADMIN — Medication 10 ML: at 12:51

## 2019-06-05 RX ADMIN — Medication 10 ML: at 07:53

## 2019-06-05 RX ADMIN — FUROSEMIDE 40 MG: 40 TABLET ORAL at 06:18

## 2019-06-05 RX ADMIN — APIXABAN 5 MG: 5 TABLET, FILM COATED ORAL at 07:52

## 2019-06-05 RX ADMIN — METOPROLOL TARTRATE 100 MG: 50 TABLET ORAL at 07:52

## 2019-06-05 RX ADMIN — METOPROLOL TARTRATE 100 MG: 50 TABLET ORAL at 20:50

## 2019-06-05 RX ADMIN — POTASSIUM CHLORIDE 10 MEQ: 10 TABLET, EXTENDED RELEASE ORAL at 07:52

## 2019-06-05 RX ADMIN — DIGOXIN 125 MCG: 125 TABLET ORAL at 07:54

## 2019-06-05 RX ADMIN — APIXABAN 5 MG: 5 TABLET, FILM COATED ORAL at 20:50

## 2019-06-05 RX ADMIN — Medication 10 ML: at 20:52

## 2019-06-05 ASSESSMENT — PAIN SCALES - GENERAL
PAINLEVEL_OUTOF10: 0

## 2019-06-05 NOTE — PROGRESS NOTES
P Quality Flow/Interdisciplinary Rounds Progress Note        Quality Flow Rounds held on June 5, 2019    Disciplines Attending:  Bedside Nurse, ,  and Nursing Unit Leadership    Trice Duggan was admitted on 5/29/2019 11:36 PM    Anticipated Discharge Date:  Expected Discharge Date: 06/04/19    Disposition:    Randell Score:  Randell Scale Score: 17    Readmission Risk              Risk of Unplanned Readmission:        11           Discussed patient goal for the day, patient clinical progression, and barriers to discharge. The following Goal(s) of the Day/Commitment(s) have been identified:  SW to discuss ADDY with pt as LTAC no longer an option.  Monitor HR      Federica Jackson  June 5, 2019

## 2019-06-05 NOTE — PROGRESS NOTES
WBC, HGB, HCT, MCV, PLT in the last 72 hours. BMP:   Recent Labs     06/03/19  0453 06/04/19  0930 06/05/19  0425    137 140   K 4.0 4.2 4.1    100 100   CO2 29 27 29   BUN 26* 21 22   CREATININE 1.0 1.0 0.9     LIVER PROFILE:   Recent Labs     06/03/19  0453 06/04/19  0930 06/05/19  0425   AST 31 48* 40*   ALT 47* 79* 78*   BILITOT 0.4 0.8 0.6   ALKPHOS 39* 48 44     PT/INR: No results for input(s): PROTIME, INR in the last 72 hours. APTT: No results for input(s): APTT in the last 72 hours. Pathology:  1. N/A      Microbiology:  1. None    Recent ABG:   No results for input(s): PH, PO2, PCO2, HCO3, BE, O2SAT, METHB, O2HB, COHB, O2CON, HHB, THB in the last 72 hours. FiO2 : 35 %       Recent Films:  XR LUMBAR SPINE (2-3 VIEWS)   Final Result    Findings consistent with moderate degenerative disc disease and   degenerative facets disease of the lumbar spine. The exam has been dictated and signed by Erna Sen. Malik Qureshi CNP. Farhad Titus MD, Radiologist, have reviewed the images and   report and concur with these findings. XR THORACIC SPINE (2 VIEWS)   Final Result    Findings consistent with moderate degenerative disc disease and   degenerative facets disease of the cervical spine. The exam has been dictated and signed by Erna Sen. Malik Qureshi CNP. Farhad Titus MD, Radiologist, have reviewed the images and   report and concur with these findings. XR CHEST PORTABLE   Final Result      Cardiomegaly      No evidence of airspace consolidation or pulmonary venous congestion. Assessment:  1. Acute hypoxic respiratory failure  2. Acute on chronic hypercapnic respiratory failure: Underlying COPD  3. Morbid obesity with likely obesity hypoventilation syndrome. Restrictive ventilatory defect. 4. Sleep apnea  5. Immobility secondary to back injury  6. Secondary polycythemia        Plan:  1. Aerosols for presumed COPD  2.  Ventilate at bedtime and with naps. 3. LTAC transfer pending        Care reviewed with the staff and the patient's family as available. Please note that voice recognition technology was used in the preparation of this note and make therefore it may contain inadvertent transcription errors. Dianna Rangel M.D., F.C.C.P.     [facility], [address] [place] [address], [address] [place] [address]

## 2019-06-05 NOTE — PROGRESS NOTES
[facility] Hospital[facility]   Progress Note    Admitting Date and Time: 5/29/2019 11:36 PM  Admit Dx: COPD exacerbation ([address] [J44.1]  COPD exacerbation ([address] [J44.1]  COPD exacerbation ([address] [J44.1]    Subjective:    Patient was admitted with COPD exacerbation ([address] [J44.1]  COPD exacerbation ([address] [J44.1]  COPD exacerbation ([address] [J44.1]. Patient breathing better today. Upset about not being able: Viagra. Denies fevers, chills, headache, vision or hearing changes, dysuria, hematuria. Patient states he is still weak. He is using the BiPAP overnight and somewhat during the day.  apixaban  5 mg Oral BID    digoxin  125 mcg Oral Daily    metoprolol tartrate  100 mg Oral BID    potassium chloride  10 mEq Oral Daily with breakfast    furosemide  40 mg Oral Daily    sodium chloride flush  10 mL Intravenous 2 times per day       acetaminophen 650 mg Q4H PRN   sodium chloride flush 10 mL PRN   magnesium hydroxide 30 mL Daily PRN   ondansetron 4 mg Q6H PRN        Objective:    /62   Pulse 67   Temp 97.5 °F (36.4 °C) (Oral)   Resp 16   Ht 5' 8\" (1.727 m)   Wt (!) 355 lb 3.2 oz (161.1 kg)   SpO2 92%   BMI 54.01 kg/m²   Gen. : Morbidly obese, increased work of breathing, mild distress  Skin: warm and dry, no rash or erythema,   Pulmonary/Chest: Diminished breath sounds throughout all lung fields, no wheezing, on oxygen  Cardiovascular: Regular rate and rhythm, no murmurs  Abdomen: soft, non-tender, non-distended, normal bowel sounds, no masses or organomegaly  Extremities: no cyanosis, no clubbing and no edema      Recent Labs     06/03/19  0453 06/04/19  0930 06/05/19  0425    137 140   K 4.0 4.2 4.1    100 100   CO2 29 27 29   BUN 26* 21 22   CREATININE 1.0 1.0 0.9   GLUCOSE 93 150* 104*   CALCIUM 8.4* 8.5* 8.5*       No results for input(s): WBC, RBC, HGB, HCT, MCV, MCH, MCHC, RDW, PLT, MPV in the last 72 hours.     CMP:    Lab Results   Component Value Date     06/05/2019 K 4.1 06/05/2019    K 5.6 05/31/2019     06/05/2019    CO2 29 06/05/2019    BUN 22 06/05/2019    CREATININE 0.9 06/05/2019    GFRAA >60 06/05/2019    LABGLOM >60 06/05/2019    GLUCOSE 104 06/05/2019    PROT 6.4 06/05/2019    LABALBU 3.6 06/05/2019    CALCIUM 8.5 06/05/2019    BILITOT 0.6 06/05/2019    ALKPHOS 44 06/05/2019    AST 40 06/05/2019    ALT 78 06/05/2019        Radiology:   XR LUMBAR SPINE (2-3 VIEWS)   Final Result    Findings consistent with moderate degenerative disc disease and   degenerative facets disease of the lumbar spine. The exam has been dictated and signed by Ling Barry. Mable Keith, TIARA. Alba Thomas MD, Radiologist, have reviewed the images and   report and concur with these findings. XR THORACIC SPINE (2 VIEWS)   Final Result    Findings consistent with moderate degenerative disc disease and   degenerative facets disease of the cervical spine. The exam has been dictated and signed by Ling Barry. Mable Keith, TIARA. Alba Tohmas MD, Radiologist, have reviewed the images and   report and concur with these findings. XR CHEST PORTABLE   Final Result      Cardiomegaly      No evidence of airspace consolidation or pulmonary venous congestion. Assessment:    Active Problems:    COPD exacerbation (HCC)    Acute respiratory failure with hypoxia (HCC)    Atrial fibrillation (HCC)    Chronic obstructive pulmonary disease (HCC)    Elevated LFTs  Resolved Problems:    * No resolved hospital problems. *      Plan:  1. Acute respiratory failure with hypoxia(pt noted ot have 88% on 3l nc on admission). Continues to require BiPAP overnight. 2. Atrial fib . Patient is now in sinus rhythm and is off Cardizem drip. Appreciate cardiology recommendations. monitor closely continue anticoagulation  3. Copd pulm following monitor. Pt on nebs, consider steroids if worsening again  4.  Elevated lfts monitor    Disposition: Plan for discharge tomorrow to [facility] if having no further episodes of A. fib with RVR      Electronically signed by Lilibeth Sun MD on 6/5/2019 at 1:45 PM

## 2019-06-05 NOTE — PROGRESS NOTES
Occupational Therapy  OT BEDSIDE TREATMENT NOTE      Date:2019  Patient Name: Yannick Sun  MRN: [id number]  : 1950  Room: [address] [place] [address]     Evaluating OT: Taj Ibarra OTR/L [id number]     AM-PAC Daily Activity Raw Score:      Recommended Adaptive Equipment: TBD     Diagnosis: COPD exacerbation. Pt presents to ED with SOB from home.      Pertinent Medical History: atrial flutter, CHF   Precautions:  Falls, O2     Home Living: Pt lives with wife in a single story apt with no steps on entry. Bathroom setup: use of BSC next to bed/chair.  Sponge bathes bed level      Prior Level of Function: wife assists with ADLs, wife manages all IADLs; completed functional mobility with no AD for SPT only per pt report in the last year  Driving: No.     Pt reports he completes urination with HH urinal, BSC for toileting     Pain Level: Back- Moderate      Cognition: A&O: /4.              Problem solving:  fair              Judgement/safety:  fair     Functional Assessment:    Initial Eval Status  Date: 19 Treatment session: 19  Short Term Goals  Treatment frequency: 2-5x/wk PRN x1-3 wks   Feeding Independent       Grooming Min A   Set up   UB Dressing Mod A  Donning/doffing hospital gown Pt declined ADLs at this time Set up   LB Dressing Dependent  Donning B socks  Mod A    Bathing Max A  Min A   Toileting Max A  Min A   Bed Mobility  Supine to sit: Max A  Sit to Supine: SBA with poor safety  Scooting: SBA with multiple rest breaks due to SOB and fatigue Supine to sit:Min A to assist with trunk    Min A   Functional Transfers STS: Mod A STS: Min A from EOB 1x and chair 2xs    SPT: Mod A using bariatric ww from bed to chair SBA   Functional Mobility NT unable to advance foot in standing at EOB with [employer] NT  Mod A during ADLs   Balance Sitting: initially fair minus, upon sitting several minutes able to achieve fair balance     Standing: poor plus at [employer]  Sitting: SBA     Standing: Min- Mod A at ww during dynamic standing balance tasks.     Activity Tolerance Poor. Frequent rest breaks between minimal periods of exertion Poor+ Pt required frequent seated rest breaks during standing balance tasks. standing delaney x3-4 min with fair minus balance during self care tasks           B UE were [facility] during tasks. Comments: Upon arrival pt was supine in bed. At end of session pt was transferred to chair with alarm on and all lines and tubes intact, call light within reach. Education: Safety training and techniques to improve standing balance during future functional tasks. · Pt has made good progress towards set goals.    · Continue with current plan of care      Total Tx Time: [address] BATES/L [id number]

## 2019-06-05 NOTE — PROGRESS NOTES
[facility]   [facility]    Admitting Date and Time: 5/29/2019 11:36 PM  Admit Dx: COPD exacerbation ([address] [J44.1]  COPD exacerbation ([address] [J44.1]  COPD exacerbation ([address] [J44.1]    Subjective:    Patient was admitted with COPD exacerbation ([address] [J44.1]  COPD exacerbation ([address] [J44.1]  COPD exacerbation ([address] [J44.1]. Patient denies fever, chills, cp, sob, n/v.     apixaban  5 mg Oral BID    [START ON 6/5/2019] digoxin  125 mcg Oral Daily    metoprolol tartrate  100 mg Oral BID    potassium chloride  10 mEq Oral Daily with breakfast    furosemide  40 mg Oral Daily    sodium chloride flush  10 mL Intravenous 2 times per day       acetaminophen 650 mg Q4H PRN   sodium chloride flush 10 mL PRN   magnesium hydroxide 30 mL Daily PRN   ondansetron 4 mg Q6H PRN        Objective:    /76   Pulse 69   Temp 98.8 °F (37.1 °C) (Axillary)   Resp 16   Ht 5' 8\" (1.727 m)   Wt (!) 357 lb 0.3 oz (161.9 kg)   SpO2 96%   BMI 54.28 kg/m²   Skin: warm and dry, no rash or erythema  Pulmonary/Chest: clear to auscultation bilaterally- no wheezes, rales or rhonchi, normal air movement, no respiratory distress  Cardiovascular: rhythm reg at rate of 70  Abdomen: soft, non-tender, non-distended, normal bowel sounds, no masses or organomegaly  Extremities: no cyanosis, no clubbing and no edema      Recent Labs     06/02/19  1020 06/03/19  0453 06/04/19  0930    139 137   K 3.5 4.0 4.2   CL 98 100 100   CO2 34* 29 27   BUN 25* 26* 21   CREATININE 0.9 1.0 1.0   GLUCOSE 97 93 150*   CALCIUM 8.4* 8.4* 8.5*       No results for input(s): WBC, RBC, HGB, HCT, MCV, MCH, MCHC, RDW, PLT, MPV in the last 72 hours.     CMP:    Lab Results   Component Value Date     06/04/2019    K 4.2 06/04/2019    K 5.6 05/31/2019     06/04/2019    CO2 27 06/04/2019    BUN 21 06/04/2019    CREATININE 1.0 06/04/2019    GFRAA >60 06/04/2019    LABGLOM >60 06/04/2019    GLUCOSE 150 06/04/2019    PROT 7.1 06/04/2019    LABALBU 3.9 06/04/2019    CALCIUM 8.5 06/04/2019    BILITOT 0.8 06/04/2019    ALKPHOS 48 06/04/2019    AST 48 06/04/2019    ALT 79 06/04/2019        Radiology:   XR LUMBAR SPINE (2-3 VIEWS)   Final Result    Findings consistent with moderate degenerative disc disease and   degenerative facets disease of the lumbar spine. The exam has been dictated and signed by Murray Vela. Jeyson Mercedes CNP. Matt Zazueta MD, Radiologist, have reviewed the images and   report and concur with these findings. XR THORACIC SPINE (2 VIEWS)   Final Result    Findings consistent with moderate degenerative disc disease and   degenerative facets disease of the cervical spine. The exam has been dictated and signed by Murray Vela. Jeyson Mercedes CNP. Matt Zazueta MD, Radiologist, have reviewed the images and   report and concur with these findings. XR CHEST PORTABLE   Final Result      Cardiomegaly      No evidence of airspace consolidation or pulmonary venous congestion. Assessment:    Active Problems:    COPD exacerbation (HCC)  Resolved Problems:    * No resolved hospital problems. *      Plan:  1. Acute respiratory failure with hypoxia(pt noted ot have 88% on 3l nc on admission)  2. Atrial fib discussed with cardio and pt to be switched from gtt to po and monitor closely continue anticoagulation  3. Copd pulm following monitor. Pt on nebs  4.  Elevated lfts monitor        Electronically signed by Wagner Carlson DO on 6/4/2019 at 11:15 PM

## 2019-06-05 NOTE — PROGRESS NOTES
Date: 6/4/2019    Time: 10:34 PM    Patient Placed On BIPAP/CPAP/ Non-Invasive Ventilation? Yes    If no must comment. Facial area red/color change? No           If YES are Blister/Lesion present? No   If yes must notify nursing staff  BIPAP/CPAP skin barrier?   Yes    Skin barrier type:Liquicel       Comments:        Renella Blizzard

## 2019-06-05 NOTE — PROGRESS NOTES
Physical Therapy    Facility/Department: [facility] MED SURG  Treatment note    NAME: Erica Danielle  : 1950  MRN: [id number]    Date of Service: 2019               Patient Diagnosis(es): The primary encounter diagnosis was COPD exacerbation ([facility] Diagnoses of Bronchitis and Hypoxia were also pertinent to this visit. has a past medical history of Atrial flutter ([facility] and CHF (congestive heart failure) ([facility] has no past surgical history on file. Evaluating Therapist: Walker Tucker PT         Room #: 643   DIAGNOSIS:  COPD exacerbation   PRECAUTIONS: falls     Social:  Pt lives with wife  in a  1  floor apartment no  steps  to enter. Prior to admission: pt reports he has not been walking. Independent with transfers per pt      Initial Evaluation  Date:  19 Treatment  19    Short Term/ Long Term   Goals   Was pt agreeable to Eval/treatment? yes  yes    Does pt have pain?  chronic back pain  Back pain with standing, L knee pain during exercise    Bed Mobility  Rolling:  SBA   Supine to sit: max assist   Sit to supine: SBA   Scooting: SBA in sit .  Mod assist in supine and repositioning  Rolling: SBA  Supine to sit: Min A with rail for support  Sit to supine: NT  Scooting: NT  SBA    Transfers Sit to stand: mod assist   Stand to sit: min assist   Stand pivot:  NT  Sit to stand: Min A  Stand to sit: Min A  Stand Pivot: Min A of 2 using [facility] for support SBA    Ambulation   unable  NT 10  feet with  ww  with  Min assist    LE ROM  AAROM WFL      LE strength  3- to 4-/ 5       AM- PAC RAW score            Balance: fair sitting EOB, poor during transfer using [facility] for support    Pt performed therapeutic exercise of the following: supine B ankle pumps, R LE heel slides, hip ABd/ADd with minimal manual resistance, L LE heel slides, hip ABd/ADd AROM x 20    Patient education  Pt was educated on exercise for circulation and strengthening, LE participation and UE usage to assist with transfers, upright posture while pivoting with [employer]    Patient response to education:   Pt verbalized understanding Pt demonstrated skill Pt requires further education in this area   yes With repeated instruction yes     Additional Comments: Nurse ok with Rx. Pt assisted to EOB, sat EOB SBA. Pt able to WB and advance LEs during pivot transfer, remains with poor balance and requires hands on assist for safety. Pt was left upright in a recliner chair with call light in reach    Treatment time: 26 minutes  Time out: 1018    Pt is making good progress toward established Physical Therapy goals as per ability to transfer bed to chair. Continue with physical therapy current plan of care.     Ac Doyle PTA   License Number: [id number]

## 2019-06-05 NOTE — CARE COORDINATION
Social work / Discharge Planning:       Dr Berny Sampson made a referral to [facility] [place] for N[facility] and they are following. Patient's wife is not present and the patient will not provide ADDY choices. He refers social work and CM to speak to his wife. Social work contacted his wife and discussed options. She requested [facility] because it is close to their home and she is familiar with it. Referral made to the liaison. Awaiting response. Electronically signed by ARCHANA Del Angel on 6/5/2019 at 1:26 PM          Patient has been accepted to discharge to [facility] with no precert needed. Patient can discharge when medically stable. N-17, [id number] and ambulette form completed. RN updated.   Electronically signed by ARCHANA Del Angel on 6/5/2019 at 1:57 PM

## 2019-06-06 VITALS
SYSTOLIC BLOOD PRESSURE: 132 MMHG | RESPIRATION RATE: 20 BRPM | DIASTOLIC BLOOD PRESSURE: 60 MMHG | HEART RATE: 61 BPM | BODY MASS INDEX: 47.74 KG/M2 | TEMPERATURE: 97.6 F | WEIGHT: 315 LBS | OXYGEN SATURATION: 95 % | HEIGHT: 68 IN

## 2019-06-06 LAB
ALBUMIN SERPL-MCNC: 3.5 G/DL (ref 3.5–5.2)
ALP BLD-CCNC: 43 U/L (ref 40–129)
ALT SERPL-CCNC: 72 U/L (ref 0–40)
ANION GAP SERPL CALCULATED.3IONS-SCNC: 12 MMOL/L (ref 7–16)
AST SERPL-CCNC: 31 U/L (ref 0–39)
BILIRUB SERPL-MCNC: 0.5 MG/DL (ref 0–1.2)
BUN BLDV-MCNC: 21 MG/DL (ref 8–23)
CALCIUM SERPL-MCNC: 8.5 MG/DL (ref 8.6–10.2)
CHLORIDE BLD-SCNC: 99 MMOL/L (ref 98–107)
CO2: 29 MMOL/L (ref 22–29)
CREAT SERPL-MCNC: 0.9 MG/DL (ref 0.7–1.2)
GFR AFRICAN AMERICAN: >60
GFR NON-AFRICAN AMERICAN: >60 ML/MIN/1.73
GLUCOSE BLD-MCNC: 95 MG/DL (ref 74–99)
POTASSIUM SERPL-SCNC: 4.2 MMOL/L (ref 3.5–5)
SODIUM BLD-SCNC: 140 MMOL/L (ref 132–146)
TOTAL PROTEIN: 6.3 G/DL (ref 6.4–8.3)

## 2019-06-06 PROCEDURE — 2580000003 HC RX 258: Performed by: PHYSICIAN ASSISTANT

## 2019-06-06 PROCEDURE — 2700000000 HC OXYGEN THERAPY PER DAY

## 2019-06-06 PROCEDURE — 99239 HOSP IP/OBS DSCHRG MGMT >30: CPT | Performed by: HOSPITALIST

## 2019-06-06 PROCEDURE — 6370000000 HC RX 637 (ALT 250 FOR IP): Performed by: INTERNAL MEDICINE

## 2019-06-06 PROCEDURE — 36415 COLL VENOUS BLD VENIPUNCTURE: CPT

## 2019-06-06 PROCEDURE — 80053 COMPREHEN METABOLIC PANEL: CPT

## 2019-06-06 PROCEDURE — 6370000000 HC RX 637 (ALT 250 FOR IP): Performed by: PHYSICIAN ASSISTANT

## 2019-06-06 PROCEDURE — 94660 CPAP INITIATION&MGMT: CPT

## 2019-06-06 RX ADMIN — METOPROLOL TARTRATE 100 MG: 50 TABLET ORAL at 08:42

## 2019-06-06 RX ADMIN — DIGOXIN 125 MCG: 125 TABLET ORAL at 08:42

## 2019-06-06 RX ADMIN — POTASSIUM CHLORIDE 10 MEQ: 10 TABLET, EXTENDED RELEASE ORAL at 08:42

## 2019-06-06 RX ADMIN — FUROSEMIDE 40 MG: 40 TABLET ORAL at 06:29

## 2019-06-06 RX ADMIN — Medication 10 ML: at 08:42

## 2019-06-06 RX ADMIN — APIXABAN 5 MG: 5 TABLET, FILM COATED ORAL at 08:42

## 2019-06-06 ASSESSMENT — PAIN SCALES - GENERAL: PAINLEVEL_OUTOF10: 0

## 2019-06-06 NOTE — PROGRESS NOTES
Report FAXED to [phone]. .. No one at [facility] and [facility] picking up to give N-T-N.  at [phone] notified of FAX sent.

## 2019-06-06 NOTE — PROGRESS NOTES
Pulmonary Progress Note    Admit Date: 2019  Hospital day                               PCP: Vasquez Montanez DO    Chief Complaint (s):  Patient Active Problem List   Diagnosis    COPD exacerbation ([facility]    Acute respiratory failure with hypoxia ([facility]    Atrial fibrillation ([facility]    [facility] disease [facility]    Elevated LFTs       Subjective:  · The waiting for transfer. Spontaneous diuresis continues. Vitals:  VITALS:  /60   Pulse 61   Temp 97.6 °F (36.4 °C) (Oral)   Resp 20   Ht 5' 8\" (1.727 m)   Wt (!) 355 lb 3.2 oz (161.1 kg)   SpO2 95%   BMI 54.01 kg/m²     24HR INTAKE/OUTPUT:      Intake/Output Summary (Last 24 hours) at 2019 0944  Last data filed at 2019 8659  Gross per 24 hour   Intake 662 ml   Output 1175 ml   Net -513 ml       24HR PULSE OXIMETRY RANGE:    SpO2  Av.5 %  Min: 95 %  Max: 96 %    Medications:  IV:      Scheduled Meds:   apixaban  5 mg Oral BID    digoxin  125 mcg Oral Daily    metoprolol tartrate  100 mg Oral BID    potassium chloride  10 mEq Oral Daily with breakfast    furosemide  40 mg Oral Daily    sodium chloride flush  10 mL Intravenous 2 times per day       Diet:   DIET CARB CONTROL;     EXAM:  General: No distress. Alert. Eyes: PERRL. No sclera icterus. No conjunctival injection. ENT: No discharge. Pharynx clear. Neck: Trachea midline. Normal thyroid. Resp: No accessory muscle use. No rales. No wheezing. No rhonchi. CV: Regular rate. Regular rhythm. No murmur or rub. Abd: Non-tender. Non-distended. No masses. No organomegaly. Normal bowel sounds. Morbidly obese. Skin: Warm and dry. No nodule on exposed extremities. No rash on exposed extremities. Ext: No cyanosis, clubbing, edema  Lymph: No cervical LAD. No supraclavicular LAD. M/S: No cyanosis. No joint deformity. No clubbing. Neuro: Awake. Follows commands. Positive pupils/gag/corneals. Normal pain response.        Results:  CBC:   No results for input(s): WBC, HGB, HCT, MCV, PLT in the last 72 hours. BMP:   Recent Labs     06/04/19  0930 06/05/19  0425 06/06/19  0520    140 140   K 4.2 4.1 4.2    100 99   CO2 27 29 29   BUN 21 22 21   CREATININE 1.0 0.9 0.9     LIVER PROFILE:   Recent Labs     06/04/19  0930 06/05/19  0425 06/06/19  0520   AST 48* 40* 31   ALT 79* 78* 72*   BILITOT 0.8 0.6 0.5   ALKPHOS 48 44 43     PT/INR: No results for input(s): PROTIME, INR in the last 72 hours. APTT: No results for input(s): APTT in the last 72 hours. Pathology:  1. N/A      Microbiology:  1. None    Recent ABG:   No results for input(s): PH, PO2, PCO2, HCO3, BE, O2SAT, METHB, O2HB, COHB, O2CON, HHB, THB in the last 72 hours. FiO2 : 35 %       Recent Films:  XR LUMBAR SPINE (2-3 VIEWS)   Final Result    Findings consistent with moderate degenerative disc disease and   degenerative facets disease of the lumbar spine. The exam has been dictated and signed by Corine Mercedes CNP. Rohan Long MD, Radiologist, have reviewed the images and   report and concur with these findings. XR THORACIC SPINE (2 VIEWS)   Final Result    Findings consistent with moderate degenerative disc disease and   degenerative facets disease of the cervical spine. The exam has been dictated and signed by Corine Mercedes CNP. Rohan Long MD, Radiologist, have reviewed the images and   report and concur with these findings. XR CHEST PORTABLE   Final Result      Cardiomegaly      No evidence of airspace consolidation or pulmonary venous congestion. Assessment:  1. Acute hypoxic respiratory failure  2. Acute on chronic hypercapnic respiratory failure: Underlying COPD  3. Morbid obesity with likely obesity hypoventilation syndrome. Restrictive ventilatory defect. 4. Sleep apnea  5. Immobility secondary to back injury  6. Secondary polycythemia        Plan:  1.  Aerosols for presumed COPD  2. Ventilate at bedtime and with naps. 3. ECF transfer pending        Care reviewed with the staff and the patient's family as available. Please note that voice recognition technology was used in the preparation of this note and make therefore it may contain inadvertent transcription errors. Tona Edwards M.D., F.C.C.P.     [facility], [address] [place] [address], [address] [place] [address]

## 2019-06-06 NOTE — DISCHARGE SUMMARY
[facility] EMERGENCY SERVICE Physician Discharge Summary       Antonella OlmsteadDO  [address] [facility] [address] [facility]  [phone]    Schedule an appointment as soon as possible for a visit in 3 weeks  Follow up after hospital admission    [facility] [address] [facility] [address] [facility]  [address] [facility]. [address] [id number] [phone]  In 3 weeks  Follow up after hospital admission    [address] [id number]  [phone]          Activity level: up with assist    Diet: DIET CARB CONTROL;    Labs:none    Dispo:SNF    Condition at discharge: stable    Continue supplemental oxygen via nasal canula @ 3 LPM round-the-clock. Continue CPAP / BiPAP during sleep and naps 12/5    Patient ID:  Johnson Mendoza  [id number]  84 y.o.  1950    Admit date: 5/29/2019    Discharge date and time:  6/6/2019  12:41 PM    Admission Diagnoses: Active Problems:    COPD exacerbation (HCC)    Acute respiratory failure with hypoxia (HCC)    Atrial fibrillation (HCC)    Chronic obstructive pulmonary disease (HCC)    Elevated LFTs  Resolved Problems:    * No resolved hospital problems. *      Discharge Diagnoses: Active Problems:    COPD exacerbation (HCC)    Acute respiratory failure with hypoxia (HCC)    Atrial fibrillation (HCC)    Chronic obstructive pulmonary disease (HCC)    Elevated LFTs  Resolved Problems:    * No resolved hospital problems. *      Consults:  IP CONSULT TO PULMONOLOGY  IP CONSULT TO SOCIAL WORK  IP CONSULT TO IV TEAM  IP CONSULT TO IV TEAM  IP CONSULT TO IV TEAM  IP CONSULT TO IV TEAM    Procedures: none    Hospital Course: Patient was admitted with COPD exacerbation ([facility] [J44.1]  COPD exacerbation ([facility] [J44.1]  COPD exacerbation ([facility] [J44.1]. Patient admitted with COPD exacerbation and acute on chronic respiratory failure. Patient has morbid obesity with hypoventilation syndrome in addition to COPD.  Patient is having difficulty breathing is requiring noninvasive ventilation to maintain oxygen and prevent hypercapnia. Patient was also known to go into atrial fibrillation with rapid ventricular response and was having difficulty maintaining heart rate based on his poor lung status. Patient was taken off of the Cardizem drip and switch to oral medication on Tuesday. By yesterday, he hasn't maintained a heart rate and remained in sinus rhythm. Doing much better today. No other complaints at this time. Continue current medications. Plan for discharge to [place]. Initially, plans for transfer to LTAC, however, did not meet criteria at removing from Cardizem drip. Resume other medications and continue oxygen. Continue PT OT. Discharge Exam:  Vitals:    06/05/19 2000 06/05/19 2315 06/05/19 2335 06/06/19 0745   BP: 130/64 (!) 110/57  132/60   Pulse: 66 59  61   Resp: 20 20 18 20   Temp: 97.7 °F (36.5 °C) 98.4 °F (36.9 °C)  97.6 °F (36.4 °C)   TempSrc: Oral Oral  Oral   SpO2: 96%   95%   Weight:       Height:         Gen. : Morbidly obese, increased work of breathing, mild distress  Skin: warm and dry, no rash or erythema,   Pulmonary/Chest: Diminished breath sounds throughout all lung fields, no wheezing, on oxygen  Cardiovascular: Regular rate and rhythm, no murmurs  Abdomen: soft, non-tender, non-distended, normal bowel sounds, no masses or organomegaly  Extremities: no cyanosis, no clubbing and no edema       I/O last 3 completed shifts: In: 840 [P.O.:840]  Out: 1175 [[facility]:1017]  I/O this shift:  In: 182 [P.O.:180; I.V.:2]  Out: 300 [Urine:300]      LABS:  Recent Labs     06/04/19  0930 06/05/19  0425 06/06/19  0520    140 140   K 4.2 4.1 4.2    100 99   CO2 27 29 29   BUN 21 22 21   CREATININE 1.0 0.9 0.9   GLUCOSE 150* 104* 95   CALCIUM 8.5* 8.5* 8.5*       No results for input(s): WBC, RBC, HGB, HCT, MCV, MCH, MCHC, RDW, PLT, MPV in the last 72 hours. No results for input(s): POCGLU in the last 72 hours.     CBC with Differential:    Lab Results   Component Value Date    WBC 10.9 05/31/2019    RBC 5.86 05/31/2019    HGB 17.8 05/31/2019    HCT 54.0 05/31/2019     05/31/2019    MCV 92.2 05/31/2019    MCH 30.4 05/31/2019    MCHC 33.0 05/31/2019    RDW 13.6 05/31/2019    LYMPHOPCT 10.9 05/31/2019    MONOPCT 5.1 05/31/2019    BASOPCT 0.1 05/31/2019    MONOSABS 0.56 05/31/2019    LYMPHSABS 1.19 05/31/2019    EOSABS 0.00 05/31/2019    BASOSABS 0.01 05/31/2019     Hepatic Function Panel:    Lab Results   Component Value Date    ALKPHOS 43 06/06/2019    ALT 72 06/06/2019    AST 31 06/06/2019    PROT 6.3 06/06/2019    BILITOT 0.5 06/06/2019    LABALBU 3.5 06/06/2019     Magnesium:    Lab Results   Component Value Date    MG 2.3 05/02/2017     Phosphorus:    Lab Results   Component Value Date    PHOS 3.8 07/04/2016     Last 3 Troponin:    Lab Results   Component Value Date    TROPONINI <0.01 05/30/2019    TROPONINI <0.01 05/15/2017    TROPONINI <0.01 05/02/2017     U/A:    Lab Results   Component Value Date    COLORU Yellow 05/16/2017    PROTEINU TRACE 05/16/2017    PHUR 5.5 05/16/2017    WBCUA 2-5 05/16/2017    RBCUA 0-1 05/16/2017    MUCUS Present 05/16/2017    BACTERIA FEW 05/16/2017    CLARITYU Clear 05/16/2017    SPECGRAV 1.020 05/16/2017    LEUKOCYTESUR Negative 05/16/2017    UROBILINOGEN 0.2 05/16/2017    BILIRUBINUR Negative 05/16/2017    BLOODU Negative 05/16/2017    GLUCOSEU Negative 05/16/2017     ABG:    Lab Results   Component Value Date    PH 7.352 05/30/2019    PCO2 50.2 05/30/2019    PO2 79.4 05/30/2019    HCO3 27.2 05/30/2019    BE 0.6 05/30/2019    O2SAT 95.7 05/30/2019       Imaging:   XR LUMBAR SPINE (2-3 VIEWS)   Final Result    Findings consistent with moderate degenerative disc disease and   degenerative facets disease of the lumbar spine. The exam has been dictated and signed by Evelia Callaway. Romel Euceda CNP.        Maggi Graham MD, Radiologist, have reviewed the images and   report and concur with these findings. XR THORACIC SPINE (2 VIEWS)   Final Result    Findings consistent with moderate degenerative disc disease and   degenerative facets disease of the cervical spine. The exam has been dictated and signed by Erna Sen. Malik Qureshi CNP. Cris Chisholm MD, Radiologist, have reviewed the images and   report and concur with these findings. XR CHEST PORTABLE   Final Result      Cardiomegaly      No evidence of airspace consolidation or pulmonary venous congestion.              Patient Instructions:      Medication List      START taking these medications    acetaminophen 325 MG tablet  Commonly known as:  TYLENOL  Take 2 tablets by mouth every 4 hours as needed for Pain     apixaban 5 MG Tabs tablet  Commonly known as:  ELIQUIS  Take 1 tablet by mouth 2 times daily     digoxin 125 MCG tablet  Commonly known as:  LANOXIN  Take 1 tablet by mouth daily     ondansetron 4 MG/2ML injection  Commonly known as:  ZOFRAN  Infuse 2 mLs intravenously every 6 hours as needed for Nausea        CHANGE how you take these medications    metoprolol 100 MG tablet  Commonly known as:  LOPRESSOR  Take 1 tablet by mouth 2 times daily  What changed:    · medication strength  · how much to take     potassium chloride 10 MEQ extended release tablet  Commonly known as:  KLOR-CON M  Take 1 tablet by mouth daily (with breakfast)  What changed:    · medication strength  · how much to take  · when to take this        CONTINUE taking these medications    furosemide 40 MG tablet  Commonly known as:  LASIX  Take 1 tablet by mouth daily           Where to Get Your Medications      Information about where to get these medications is not yet available    Ask your nurse or doctor about these medications  · acetaminophen 325 MG tablet  · apixaban 5 MG Tabs tablet  · digoxin 125 MCG tablet  · furosemide 40 MG tablet  · metoprolol 100 MG tablet  · ondansetron 4 MG/2ML injection  · potassium chloride 10 MEQ extended release tablet           Note that more than 30 minutes was spent in preparing discharge papers, discussing discharge with patient, medication review, etc.    Signed:  Electronically signed by Eveline Araiza MD on 6/6/2019 at 12:41 PM    NOTE: This report was transcribed using voice recognition software. Every effort was made to ensure accuracy; however, inadvertent computerized transcription errors may be present.

## 2019-06-06 NOTE — PROGRESS NOTES
[facility] Quality Flow/Interdisciplinary Rounds Progress Note        Quality Flow Rounds held on June 6, 2019    Disciplines Attending:  Bedside Nurse, ,  and Nursing Unit Leadership    Socorro Jackson was admitted on 5/29/2019 11:36 PM    Anticipated Discharge Date:  Expected Discharge Date: 06/04/19    Disposition:    Randell Score:  Randell Scale Score: 17    Readmission Risk              Risk of Unplanned Readmission:        11           Discussed patient goal for the day, patient clinical progression, and barriers to discharge. The following Goal(s) of the Day/Commitment(s) have been identified:  Discharge to SNF today.       Marcelina Ramirez  June 6, 2019

## 2019-06-06 NOTE — PROGRESS NOTES
06/05/19 2335   NIV Type   NIV Started Yes   Equipment Type v60   Mode AVAPS   Mask Type Full face mask   Mask Size Medium   Settings/Measurements   Comfort Level Good   Using Accessory Muscles No   EPAP 7 cmH2O   Vt Ordered 450 mL   Rate Ordered 16   Resp 18   FiO2  35 %   I Time/ I Time % 0.9 s   Vt Exhaled 850 mL   Mask Leak (lpm) 35 lpm   Skin Protection for O2 Device Yes   Date: 6/5/2019    Time: 11:43 PM    Patient Placed On BIPAP/CPAP/ Non-Invasive Ventilation? Yes    If no must comment. Facial area red/color change? No           If YES are Blister/Lesion present? No   If yes must notify nursing staff  BIPAP/CPAP skin barrier?   Yes    Skin barrier type:Liquicel       Comments:        Sindy Husain

## 2019-06-25 ENCOUNTER — APPOINTMENT (OUTPATIENT)
Dept: GENERAL RADIOLOGY | Age: 69
DRG: 871 | End: 2019-06-25
Payer: MEDICARE

## 2019-06-25 ENCOUNTER — TELEPHONE (OUTPATIENT)
Dept: OTHER | Facility: CLINIC | Age: 69
End: 2019-06-25

## 2019-06-25 ENCOUNTER — HOSPITAL ENCOUNTER (INPATIENT)
Age: 69
LOS: 4 days | Discharge: NURSING, REHAB OR LONG-TERM CARE | DRG: 871 | End: 2019-06-29
Attending: EMERGENCY MEDICINE | Admitting: INTERNAL MEDICINE
Payer: MEDICARE

## 2019-06-25 DIAGNOSIS — A41.9 SEPTICEMIA (HCC): ICD-10-CM

## 2019-06-25 DIAGNOSIS — I48.91 ATRIAL FIBRILLATION WITH RVR (HCC): Primary | ICD-10-CM

## 2019-06-25 DIAGNOSIS — J18.9 PNEUMONIA DUE TO ORGANISM: ICD-10-CM

## 2019-06-25 DIAGNOSIS — R09.02 HYPOXIA: ICD-10-CM

## 2019-06-25 DIAGNOSIS — I50.43 ACUTE ON CHRONIC COMBINED SYSTOLIC AND DIASTOLIC CONGESTIVE HEART FAILURE (HCC): ICD-10-CM

## 2019-06-25 LAB
ANION GAP SERPL CALCULATED.3IONS-SCNC: 15 MMOL/L (ref 7–16)
BASOPHILS ABSOLUTE: 0.07 E9/L (ref 0–0.2)
BASOPHILS RELATIVE PERCENT: 0.7 % (ref 0–2)
BUN BLDV-MCNC: 16 MG/DL (ref 8–23)
CALCIUM SERPL-MCNC: 8.8 MG/DL (ref 8.6–10.2)
CHLORIDE BLD-SCNC: 94 MMOL/L (ref 98–107)
CO2: 27 MMOL/L (ref 22–29)
CREAT SERPL-MCNC: 0.8 MG/DL (ref 0.7–1.2)
DIGOXIN LEVEL: 0.2 NG/ML (ref 0.8–2)
EOSINOPHILS ABSOLUTE: 0.29 E9/L (ref 0.05–0.5)
EOSINOPHILS RELATIVE PERCENT: 2.9 % (ref 0–6)
GFR AFRICAN AMERICAN: >60
GFR NON-AFRICAN AMERICAN: >60 ML/MIN/1.73
GLUCOSE BLD-MCNC: 111 MG/DL (ref 74–99)
HCT VFR BLD CALC: 50.7 % (ref 37–54)
HEMOGLOBIN: 16.3 G/DL (ref 12.5–16.5)
IMMATURE GRANULOCYTES #: 0.17 E9/L
IMMATURE GRANULOCYTES %: 1.7 % (ref 0–5)
LACTIC ACID, SEPSIS: 3.7 MMOL/L (ref 0.5–1.9)
LYMPHOCYTES ABSOLUTE: 1.86 E9/L (ref 1.5–4)
LYMPHOCYTES RELATIVE PERCENT: 18.7 % (ref 20–42)
MAGNESIUM: 2.2 MG/DL (ref 1.6–2.6)
MCH RBC QN AUTO: 30.1 PG (ref 26–35)
MCHC RBC AUTO-ENTMCNC: 32.1 % (ref 32–34.5)
MCV RBC AUTO: 93.5 FL (ref 80–99.9)
MONOCYTES ABSOLUTE: 0.58 E9/L (ref 0.1–0.95)
MONOCYTES RELATIVE PERCENT: 5.8 % (ref 2–12)
NEUTROPHILS ABSOLUTE: 7 E9/L (ref 1.8–7.3)
NEUTROPHILS RELATIVE PERCENT: 70.2 % (ref 43–80)
PDW BLD-RTO: 14 FL (ref 11.5–15)
PLATELET # BLD: 296 E9/L (ref 130–450)
PMV BLD AUTO: 9.7 FL (ref 7–12)
POTASSIUM SERPL-SCNC: 4.9 MMOL/L (ref 3.5–5)
PRO-BNP: 5612 PG/ML (ref 0–125)
RBC # BLD: 5.42 E12/L (ref 3.8–5.8)
SODIUM BLD-SCNC: 136 MMOL/L (ref 132–146)
TROPONIN: <0.01 NG/ML (ref 0–0.03)
WBC # BLD: 10 E9/L (ref 4.5–11.5)

## 2019-06-25 PROCEDURE — 83735 ASSAY OF MAGNESIUM: CPT

## 2019-06-25 PROCEDURE — 36415 COLL VENOUS BLD VENIPUNCTURE: CPT

## 2019-06-25 PROCEDURE — 83605 ASSAY OF LACTIC ACID: CPT

## 2019-06-25 PROCEDURE — 83880 ASSAY OF NATRIURETIC PEPTIDE: CPT

## 2019-06-25 PROCEDURE — 80048 BASIC METABOLIC PNL TOTAL CA: CPT

## 2019-06-25 PROCEDURE — 2580000003 HC RX 258: Performed by: INTERNAL MEDICINE

## 2019-06-25 PROCEDURE — 93005 ELECTROCARDIOGRAM TRACING: CPT | Performed by: STUDENT IN AN ORGANIZED HEALTH CARE EDUCATION/TRAINING PROGRAM

## 2019-06-25 PROCEDURE — 96376 TX/PRO/DX INJ SAME DRUG ADON: CPT

## 2019-06-25 PROCEDURE — 96365 THER/PROPH/DIAG IV INF INIT: CPT

## 2019-06-25 PROCEDURE — 6360000002 HC RX W HCPCS: Performed by: STUDENT IN AN ORGANIZED HEALTH CARE EDUCATION/TRAINING PROGRAM

## 2019-06-25 PROCEDURE — 6370000000 HC RX 637 (ALT 250 FOR IP): Performed by: EMERGENCY MEDICINE

## 2019-06-25 PROCEDURE — 71045 X-RAY EXAM CHEST 1 VIEW: CPT

## 2019-06-25 PROCEDURE — 2000000000 HC ICU R&B

## 2019-06-25 PROCEDURE — 96375 TX/PRO/DX INJ NEW DRUG ADDON: CPT

## 2019-06-25 PROCEDURE — 96366 THER/PROPH/DIAG IV INF ADDON: CPT

## 2019-06-25 PROCEDURE — P9045 ALBUMIN (HUMAN), 5%, 250 ML: HCPCS | Performed by: EMERGENCY MEDICINE

## 2019-06-25 PROCEDURE — 80162 ASSAY OF DIGOXIN TOTAL: CPT

## 2019-06-25 PROCEDURE — 94761 N-INVAS EAR/PLS OXIMETRY MLT: CPT

## 2019-06-25 PROCEDURE — 2500000003 HC RX 250 WO HCPCS: Performed by: STUDENT IN AN ORGANIZED HEALTH CARE EDUCATION/TRAINING PROGRAM

## 2019-06-25 PROCEDURE — 2580000003 HC RX 258: Performed by: STUDENT IN AN ORGANIZED HEALTH CARE EDUCATION/TRAINING PROGRAM

## 2019-06-25 PROCEDURE — 85025 COMPLETE CBC W/AUTO DIFF WBC: CPT

## 2019-06-25 PROCEDURE — 84484 ASSAY OF TROPONIN QUANT: CPT

## 2019-06-25 PROCEDURE — 6360000002 HC RX W HCPCS: Performed by: EMERGENCY MEDICINE

## 2019-06-25 PROCEDURE — 96367 TX/PROPH/DG ADDL SEQ IV INF: CPT

## 2019-06-25 PROCEDURE — 2580000003 HC RX 258: Performed by: EMERGENCY MEDICINE

## 2019-06-25 PROCEDURE — 2500000003 HC RX 250 WO HCPCS: Performed by: INTERNAL MEDICINE

## 2019-06-25 PROCEDURE — 99291 CRITICAL CARE FIRST HOUR: CPT

## 2019-06-25 RX ORDER — 0.9 % SODIUM CHLORIDE 0.9 %
500 INTRAVENOUS SOLUTION INTRAVENOUS ONCE
Status: COMPLETED | OUTPATIENT
Start: 2019-06-25 | End: 2019-06-25

## 2019-06-25 RX ORDER — DIGOXIN 0.25 MG/ML
250 INJECTION INTRAMUSCULAR; INTRAVENOUS ONCE
Status: COMPLETED | OUTPATIENT
Start: 2019-06-25 | End: 2019-06-25

## 2019-06-25 RX ORDER — ALBUMIN, HUMAN INJ 5% 5 %
12.5 SOLUTION INTRAVENOUS ONCE
Status: COMPLETED | OUTPATIENT
Start: 2019-06-25 | End: 2019-06-25

## 2019-06-25 RX ORDER — FUROSEMIDE 10 MG/ML
40 INJECTION INTRAMUSCULAR; INTRAVENOUS ONCE
Status: COMPLETED | OUTPATIENT
Start: 2019-06-25 | End: 2019-06-25

## 2019-06-25 RX ORDER — ONDANSETRON 2 MG/ML
4 INJECTION INTRAMUSCULAR; INTRAVENOUS EVERY 6 HOURS PRN
Status: DISCONTINUED | OUTPATIENT
Start: 2019-06-25 | End: 2019-06-25 | Stop reason: CLARIF

## 2019-06-25 RX ORDER — ONDANSETRON 2 MG/ML
4 INJECTION INTRAMUSCULAR; INTRAVENOUS EVERY 6 HOURS PRN
Status: DISCONTINUED | OUTPATIENT
Start: 2019-06-25 | End: 2019-06-27

## 2019-06-25 RX ORDER — ACETAMINOPHEN 325 MG/1
650 TABLET ORAL ONCE
Status: COMPLETED | OUTPATIENT
Start: 2019-06-25 | End: 2019-06-25

## 2019-06-25 RX ORDER — SODIUM CHLORIDE 0.9 % (FLUSH) 0.9 %
10 SYRINGE (ML) INJECTION PRN
Status: DISCONTINUED | OUTPATIENT
Start: 2019-06-25 | End: 2019-06-29 | Stop reason: HOSPADM

## 2019-06-25 RX ORDER — FUROSEMIDE 40 MG/1
40 TABLET ORAL DAILY
Status: DISCONTINUED | OUTPATIENT
Start: 2019-06-26 | End: 2019-06-26

## 2019-06-25 RX ORDER — SODIUM CHLORIDE 0.9 % (FLUSH) 0.9 %
10 SYRINGE (ML) INJECTION EVERY 12 HOURS SCHEDULED
Status: DISCONTINUED | OUTPATIENT
Start: 2019-06-25 | End: 2019-06-29 | Stop reason: HOSPADM

## 2019-06-25 RX ORDER — ACETAMINOPHEN 325 MG/1
650 TABLET ORAL EVERY 4 HOURS PRN
Status: DISCONTINUED | OUTPATIENT
Start: 2019-06-25 | End: 2019-06-29 | Stop reason: HOSPADM

## 2019-06-25 RX ADMIN — DEXTROSE MONOHYDRATE 15 MG/HR: 50 INJECTION, SOLUTION INTRAVENOUS at 23:51

## 2019-06-25 RX ADMIN — FUROSEMIDE 40 MG: 10 INJECTION, SOLUTION INTRAMUSCULAR; INTRAVENOUS at 15:59

## 2019-06-25 RX ADMIN — DIGOXIN 250 MCG: 0.25 INJECTION INTRAMUSCULAR; INTRAVENOUS at 18:03

## 2019-06-25 RX ADMIN — SODIUM CHLORIDE 500 ML: 9 INJECTION, SOLUTION INTRAVENOUS at 20:10

## 2019-06-25 RX ADMIN — PIPERACILLIN, TAZOBACTAM 4.5 G: 4; .5 INJECTION, POWDER, LYOPHILIZED, FOR SOLUTION INTRAVENOUS at 16:00

## 2019-06-25 RX ADMIN — SODIUM CHLORIDE 500 ML: 9 INJECTION, SOLUTION INTRAVENOUS at 14:33

## 2019-06-25 RX ADMIN — ACETAMINOPHEN 650 MG: 325 TABLET, FILM COATED ORAL at 21:18

## 2019-06-25 RX ADMIN — DEXTROSE MONOHYDRATE 5 MG/HR: 50 INJECTION, SOLUTION INTRAVENOUS at 17:35

## 2019-06-25 RX ADMIN — VANCOMYCIN HYDROCHLORIDE 2500 MG: 1 INJECTION, POWDER, LYOPHILIZED, FOR SOLUTION INTRAVENOUS at 16:44

## 2019-06-25 RX ADMIN — ALBUMIN (HUMAN) 12.5 G: 12.5 INJECTION, SOLUTION INTRAVENOUS at 20:42

## 2019-06-25 RX ADMIN — Medication 10 ML: at 23:51

## 2019-06-25 RX ADMIN — DIGOXIN 250 MCG: 0.25 INJECTION INTRAMUSCULAR; INTRAVENOUS at 15:59

## 2019-06-25 ASSESSMENT — ENCOUNTER SYMPTOMS
TROUBLE SWALLOWING: 0
COUGH: 1
CHEST TIGHTNESS: 0
BACK PAIN: 0
SORE THROAT: 0
ABDOMINAL PAIN: 0
WHEEZING: 0
NAUSEA: 0
VOICE CHANGE: 0
PHOTOPHOBIA: 0
VOMITING: 0
SHORTNESS OF BREATH: 1

## 2019-06-25 ASSESSMENT — PAIN SCALES - GENERAL
PAINLEVEL_OUTOF10: 8
PAINLEVEL_OUTOF10: 0

## 2019-06-25 NOTE — TELEPHONE ENCOUNTER
Writer contacted Gama Vallecillo, ED provider to inform of 30 day readmission risk. ED provider informed writer of readmission.

## 2019-06-25 NOTE — ED PROVIDER NOTES
Patient is a 80-year-old male who presents to the emergency department for evaluation of atrial fibrillation and shortness of breath. Patient states that he was at his rehab facility and the staff noticed that he was tachycardic. Patient was feeling mildly short of breath as well and was placed on 2 L oxygen nasal cannula. Patient has a known history of atrial fibrillation and follows with the heart clinic. Patient is denying any lightheadedness, dizziness, change in vision, chest pain, nausea, vomiting, abdominal pain. Patient states he has been taking his medications as prescribed. Patient is also currently being treated for pneumonia with levaquin and rocephin at the rehab facility via PICC line. The history is provided by the patient. Palpitations   Palpitations quality:  Irregular  Onset quality:  Gradual  Timing:  Constant  Progression:  Unchanged  Chronicity:  Chronic  Relieved by:  Nothing  Worsened by:  Nothing  Ineffective treatments:  None tried  Associated symptoms: cough, malaise/fatigue and shortness of breath    Associated symptoms: no back pain, no chest pain, no chest pressure, no diaphoresis, no dizziness, no leg pain, no nausea, no numbness, no syncope, no vomiting and no weakness    Risk factors: diabetes mellitus and hx of atrial fibrillation        Review of Systems   Constitutional: Positive for fatigue and malaise/fatigue. Negative for chills, diaphoresis and fever. HENT: Negative for congestion, sore throat, trouble swallowing and voice change. Eyes: Negative for photophobia and visual disturbance. Respiratory: Positive for cough and shortness of breath. Negative for chest tightness and wheezing. Cardiovascular: Positive for palpitations. Negative for chest pain, leg swelling and syncope. Gastrointestinal: Negative for abdominal pain, nausea and vomiting. Musculoskeletal: Negative for back pain, myalgias, neck pain and neck stiffness.    Skin: Negative for pallor no known allergies.     -------------------------------------------------- RESULTS -------------------------------------------------    Lab  Results for orders placed or performed during the hospital encounter of 06/25/19   CBC Auto Differential   Result Value Ref Range    WBC 10.0 4.5 - 11.5 E9/L    RBC 5.42 3.80 - 5.80 E12/L    Hemoglobin 16.3 12.5 - 16.5 g/dL    Hematocrit 50.7 37.0 - 54.0 %    MCV 93.5 80.0 - 99.9 fL    MCH 30.1 26.0 - 35.0 pg    MCHC 32.1 32.0 - 34.5 %    RDW 14.0 11.5 - 15.0 fL    Platelets 680 595 - 249 E9/L    MPV 9.7 7.0 - 12.0 fL    Neutrophils % 70.2 43.0 - 80.0 %    Immature Granulocytes % 1.7 0.0 - 5.0 %    Lymphocytes % 18.7 (L) 20.0 - 42.0 %    Monocytes % 5.8 2.0 - 12.0 %    Eosinophils % 2.9 0.0 - 6.0 %    Basophils % 0.7 0.0 - 2.0 %    Neutrophils # 7.00 1.80 - 7.30 E9/L    Immature Granulocytes # 0.17 E9/L    Lymphocytes # 1.86 1.50 - 4.00 E9/L    Monocytes # 0.58 0.10 - 0.95 E9/L    Eosinophils # 0.29 0.05 - 0.50 E9/L    Basophils # 0.07 0.00 - 0.20 F8/J   Basic Metabolic Panel   Result Value Ref Range    Sodium 136 132 - 146 mmol/L    Potassium 4.9 3.5 - 5.0 mmol/L    Chloride 94 (L) 98 - 107 mmol/L    CO2 27 22 - 29 mmol/L    Anion Gap 15 7 - 16 mmol/L    Glucose 111 (H) 74 - 99 mg/dL    BUN 16 8 - 23 mg/dL    CREATININE 0.8 0.7 - 1.2 mg/dL    GFR Non-African American >60 >=60 mL/min/1.73    GFR African American >60     Calcium 8.8 8.6 - 10.2 mg/dL   Troponin   Result Value Ref Range    Troponin <0.01 0.00 - 0.03 ng/mL   Magnesium   Result Value Ref Range    Magnesium 2.2 1.6 - 2.6 mg/dL   Digoxin level   Result Value Ref Range    Digoxin Lvl 0.2 (L) 0.8 - 2.0 ng/mL   Lactate, Sepsis   Result Value Ref Range    Lactic Acid, Sepsis 3.7 (H) 0.5 - 1.9 mmol/L   Brain Natriuretic Peptide   Result Value Ref Range    Pro-BNP 5,612 (H) 0 - 125 pg/mL   EKG 12 Lead   Result Value Ref Range    Ventricular Rate 134 BPM    Atrial Rate 268 BPM    QRS Duration 158 ms    Q-T Interval 392 ms QTc Calculation (Bazett) 585 ms    R Axis -88 degrees    T Axis -89 degrees       Radiology  XR CHEST PORTABLE   Final Result   1. Stable, large cardiomediastinal silhouette with worsening   underlying pulmonary edema and thoracic aortic vascular   calcifications. 2. Nonspecific bibasilar airspace disease, findings can be seen in   infiltrate/pneumonia and/or atelectasis. . Bilateral pleural effusions. EKG: This EKG is signed and interpreted by me. Rate: 136  Rhythm: Atrial flutter  Interpretation: atrial flutter with 2:1 AV conduction  Comparison: changes compared to previous EKG      ------------------------- NURSING NOTES AND VITALS REVIEWED ---------------------------  Date / Time Roomed:  6/25/2019  2:13 PM  ED Bed Assignment:  22/22    The nursing notes within the ED encounter and vital signs as below have been reviewed.    Patient Vitals for the past 24 hrs:   BP Temp Pulse Resp SpO2 Height Weight   06/25/19 2042 108/84 -- 103 24 -- -- --   06/25/19 2005 103/83 -- 136 24 (!) 84 % -- --   06/25/19 1938 122/79 -- 115 18 90 % -- --   06/25/19 1913 129/83 -- 136 30 (!) 89 % -- --   06/25/19 1855 (!) 146/70 -- 95 25 (!) 89 % -- --   06/25/19 1843 119/76 -- 135 13 90 % -- --   06/25/19 1840 136/79 -- 134 14 90 % -- --   06/25/19 1805 130/85 -- 134 18 92 % -- --   06/25/19 1803 -- -- 136 -- -- -- --   06/25/19 1755 100/73 -- 133 13 92 % -- --   06/25/19 1750 100/73 -- 136 22 (!) 89 % -- --   06/25/19 1735 122/88 -- 135 24 90 % -- --   06/25/19 1725 102/79 -- 136 20 90 % -- --   06/25/19 1710 100/79 98.4 °F (36.9 °C) 135 27 90 % -- --   06/25/19 1645 114/86 -- 135 24 (!) 88 % -- --   06/25/19 1559 -- -- 114 -- -- -- --   06/25/19 1535 110/84 -- 114 21 91 % -- --   06/25/19 1531 106/88 -- 136 21 (!) 86 % -- --   06/25/19 1530 (!) 125/109 -- 117 (!) 34 (!) 88 % -- --   06/25/19 1520 112/83 -- 134 27 (!) 88 % -- --   06/25/19 1516 115/84 -- 134 15 90 % -- --   06/25/19 1512 119/89 -- 135 (!) 34 92 % -- -- 06/25/19 1501 95/66 -- 136 24 (!) 88 % -- --   06/25/19 1458 95/79 -- 135 24 (!) 89 % -- --   06/25/19 1453 95/79 -- 134 20 92 % -- --   06/25/19 1413 (!) 171/112 98.4 °F (36.9 °C) 137 (!) 32 90 % 5' 8\" (1.727 m) (!) 357 lb (161.9 kg)       Oxygen Saturation Interpretation: Abnormal      ------------------------------------------ PROGRESS NOTES ------------------------------------------  Re-evaluation(s):  Time: 6607. Patients symptoms show no change  Repeat physical examination is not changed    Time: 1530. Patients symptoms show no change  Repeat physical examination is not changed    I have spoken with the patient and discussed todays results, in addition to providing specific details for the plan of care and counseling regarding the diagnosis and prognosis. Their questions are answered at this time and they are agreeable with the plan.      --------------------------------- ADDITIONAL PROVIDER NOTES ---------------------------------  Consultations:  Spoke with Dr. Heath Reyna,  They will provide critical care management in the ICU. This patient's ED course included: a personal history and physicial examination, re-evaluation prior to disposition, multiple bedside re-evaluations, IV medications, cardiac monitoring and continuous pulse oximetry    This patient has remained hemodynamically stable and remained unchanged during their ED course. Please note that the withdrawal or failure to initiate urgent interventions for this patient would likely result in a life threatening deterioration or permanent disability. Accordingly this patient received 30 minutes of critical care time, excluding separately billable procedures. Clinical Impression  1. Atrial fibrillation with RVR ([address]    2. Pneumonia due to organism    3. Acute on chronic combined systolic and diastolic congestive heart failure ([facility]    4. Hypoxia    5.  Septicemia ([facility]          Disposition  Patient's disposition: Admit to

## 2019-06-26 ENCOUNTER — APPOINTMENT (OUTPATIENT)
Dept: GENERAL RADIOLOGY | Age: 69
DRG: 871 | End: 2019-06-26
Payer: MEDICARE

## 2019-06-26 PROBLEM — I45.10 RIGHT BUNDLE BRANCH BLOCK: Status: ACTIVE | Noted: 2019-06-26

## 2019-06-26 PROBLEM — I48.92 ATRIAL FLUTTER WITH RAPID VENTRICULAR RESPONSE (HCC): Status: ACTIVE | Noted: 2019-06-26

## 2019-06-26 PROBLEM — I50.33 ACUTE ON CHRONIC DIASTOLIC (CONGESTIVE) HEART FAILURE (HCC): Status: ACTIVE | Noted: 2019-06-26

## 2019-06-26 LAB
ANION GAP SERPL CALCULATED.3IONS-SCNC: 12 MMOL/L (ref 7–16)
BASOPHILS ABSOLUTE: 0.07 E9/L (ref 0–0.2)
BASOPHILS RELATIVE PERCENT: 0.9 % (ref 0–2)
BUN BLDV-MCNC: 11 MG/DL (ref 8–23)
CALCIUM SERPL-MCNC: 8.3 MG/DL (ref 8.6–10.2)
CHLORIDE BLD-SCNC: 99 MMOL/L (ref 98–107)
CO2: 27 MMOL/L (ref 22–29)
CREAT SERPL-MCNC: 0.7 MG/DL (ref 0.7–1.2)
EKG ATRIAL RATE: 268 BPM
EKG ATRIAL RATE: 277 BPM
EKG P AXIS: 84 DEGREES
EKG Q-T INTERVAL: 380 MS
EKG Q-T INTERVAL: 392 MS
EKG QRS DURATION: 158 MS
EKG QRS DURATION: 96 MS
EKG QTC CALCULATION (BAZETT): 416 MS
EKG QTC CALCULATION (BAZETT): 585 MS
EKG R AXIS: -36 DEGREES
EKG R AXIS: -88 DEGREES
EKG T AXIS: -89 DEGREES
EKG T AXIS: 54 DEGREES
EKG VENTRICULAR RATE: 134 BPM
EKG VENTRICULAR RATE: 72 BPM
EOSINOPHILS ABSOLUTE: 0.32 E9/L (ref 0.05–0.5)
EOSINOPHILS RELATIVE PERCENT: 4 % (ref 0–6)
FILM ARRAY ADENOVIRUS: NORMAL
FILM ARRAY BORDETELLA PERTUSSIS: NORMAL
FILM ARRAY CHLAMYDOPHILIA PNEUMONIAE: NORMAL
FILM ARRAY CORONAVIRUS 229E: NORMAL
FILM ARRAY CORONAVIRUS HKU1: NORMAL
FILM ARRAY CORONAVIRUS NL63: NORMAL
FILM ARRAY CORONAVIRUS OC43: NORMAL
FILM ARRAY INFLUENZA A VIRUS 09H1: NORMAL
FILM ARRAY INFLUENZA A VIRUS H1: NORMAL
FILM ARRAY INFLUENZA A VIRUS H3: NORMAL
FILM ARRAY INFLUENZA A VIRUS: NORMAL
FILM ARRAY INFLUENZA B: NORMAL
FILM ARRAY METAPNEUMOVIRUS: NORMAL
FILM ARRAY MYCOPLASMA PNEUMONIAE: NORMAL
FILM ARRAY PARAINFLUENZA VIRUS 1: NORMAL
FILM ARRAY PARAINFLUENZA VIRUS 2: NORMAL
FILM ARRAY PARAINFLUENZA VIRUS 3: NORMAL
FILM ARRAY PARAINFLUENZA VIRUS 4: NORMAL
FILM ARRAY RESPIRATORY SYNCITIAL VIRUS: NORMAL
FILM ARRAY RHINOVIRUS/ENTEROVIRUS: NORMAL
GFR AFRICAN AMERICAN: >60
GFR NON-AFRICAN AMERICAN: >60 ML/MIN/1.73
GLUCOSE BLD-MCNC: 116 MG/DL (ref 74–99)
HCT VFR BLD CALC: 49.9 % (ref 37–54)
HEMOGLOBIN: 15.7 G/DL (ref 12.5–16.5)
IMMATURE GRANULOCYTES #: 0.17 E9/L
IMMATURE GRANULOCYTES %: 2.1 % (ref 0–5)
LACTIC ACID: 1 MMOL/L (ref 0.5–2.2)
LYMPHOCYTES ABSOLUTE: 1.36 E9/L (ref 1.5–4)
LYMPHOCYTES RELATIVE PERCENT: 16.9 % (ref 20–42)
MAGNESIUM: 2.1 MG/DL (ref 1.6–2.6)
MCH RBC QN AUTO: 30.3 PG (ref 26–35)
MCHC RBC AUTO-ENTMCNC: 31.5 % (ref 32–34.5)
MCV RBC AUTO: 96.1 FL (ref 80–99.9)
MONOCYTES ABSOLUTE: 0.54 E9/L (ref 0.1–0.95)
MONOCYTES RELATIVE PERCENT: 6.7 % (ref 2–12)
NEUTROPHILS ABSOLUTE: 5.58 E9/L (ref 1.8–7.3)
NEUTROPHILS RELATIVE PERCENT: 69.4 % (ref 43–80)
PDW BLD-RTO: 13.9 FL (ref 11.5–15)
PLATELET # BLD: 224 E9/L (ref 130–450)
PMV BLD AUTO: 9.4 FL (ref 7–12)
POTASSIUM REFLEX MAGNESIUM: 4.1 MMOL/L (ref 3.5–5)
PROCALCITONIN: 0.08 NG/ML (ref 0–0.08)
RBC # BLD: 5.19 E12/L (ref 3.8–5.8)
SODIUM BLD-SCNC: 138 MMOL/L (ref 132–146)
TSH SERPL DL<=0.05 MIU/L-ACNC: 2.99 UIU/ML (ref 0.27–4.2)
WBC # BLD: 8 E9/L (ref 4.5–11.5)

## 2019-06-26 PROCEDURE — 87581 M.PNEUMON DNA AMP PROBE: CPT

## 2019-06-26 PROCEDURE — 6360000002 HC RX W HCPCS: Performed by: INTERNAL MEDICINE

## 2019-06-26 PROCEDURE — 87081 CULTURE SCREEN ONLY: CPT

## 2019-06-26 PROCEDURE — 80048 BASIC METABOLIC PNL TOTAL CA: CPT

## 2019-06-26 PROCEDURE — 94660 CPAP INITIATION&MGMT: CPT

## 2019-06-26 PROCEDURE — 87070 CULTURE OTHR SPECIMN AEROBIC: CPT

## 2019-06-26 PROCEDURE — 6370000000 HC RX 637 (ALT 250 FOR IP): Performed by: INTERNAL MEDICINE

## 2019-06-26 PROCEDURE — 2060000000 HC ICU INTERMEDIATE R&B

## 2019-06-26 PROCEDURE — 85025 COMPLETE CBC W/AUTO DIFF WBC: CPT

## 2019-06-26 PROCEDURE — 36415 COLL VENOUS BLD VENIPUNCTURE: CPT

## 2019-06-26 PROCEDURE — 84145 PROCALCITONIN (PCT): CPT

## 2019-06-26 PROCEDURE — 87798 DETECT AGENT NOS DNA AMP: CPT

## 2019-06-26 PROCEDURE — 87486 CHLMYD PNEUM DNA AMP PROBE: CPT

## 2019-06-26 PROCEDURE — 2700000000 HC OXYGEN THERAPY PER DAY

## 2019-06-26 PROCEDURE — 87206 SMEAR FLUORESCENT/ACID STAI: CPT

## 2019-06-26 PROCEDURE — 84443 ASSAY THYROID STIM HORMONE: CPT

## 2019-06-26 PROCEDURE — 71045 X-RAY EXAM CHEST 1 VIEW: CPT

## 2019-06-26 PROCEDURE — 93005 ELECTROCARDIOGRAM TRACING: CPT | Performed by: INTERNAL MEDICINE

## 2019-06-26 PROCEDURE — 93010 ELECTROCARDIOGRAM REPORT: CPT | Performed by: INTERNAL MEDICINE

## 2019-06-26 PROCEDURE — 2580000003 HC RX 258: Performed by: INTERNAL MEDICINE

## 2019-06-26 PROCEDURE — 87633 RESP VIRUS 12-25 TARGETS: CPT

## 2019-06-26 PROCEDURE — 94664 DEMO&/EVAL PT USE INHALER: CPT

## 2019-06-26 PROCEDURE — 87040 BLOOD CULTURE FOR BACTERIA: CPT

## 2019-06-26 PROCEDURE — 83605 ASSAY OF LACTIC ACID: CPT

## 2019-06-26 PROCEDURE — 2500000003 HC RX 250 WO HCPCS: Performed by: INTERNAL MEDICINE

## 2019-06-26 PROCEDURE — 83735 ASSAY OF MAGNESIUM: CPT

## 2019-06-26 PROCEDURE — 87205 SMEAR GRAM STAIN: CPT

## 2019-06-26 PROCEDURE — 94640 AIRWAY INHALATION TREATMENT: CPT

## 2019-06-26 RX ORDER — METOPROLOL TARTRATE 50 MG/1
100 TABLET, FILM COATED ORAL 2 TIMES DAILY
Status: DISCONTINUED | OUTPATIENT
Start: 2019-06-26 | End: 2019-06-29 | Stop reason: HOSPADM

## 2019-06-26 RX ORDER — DIGOXIN 125 MCG
125 TABLET ORAL DAILY
Status: DISCONTINUED | OUTPATIENT
Start: 2019-06-26 | End: 2019-06-26 | Stop reason: SDUPTHER

## 2019-06-26 RX ORDER — POTASSIUM CHLORIDE 750 MG/1
10 TABLET, EXTENDED RELEASE ORAL
Status: DISCONTINUED | OUTPATIENT
Start: 2019-06-27 | End: 2019-06-29 | Stop reason: HOSPADM

## 2019-06-26 RX ORDER — ONDANSETRON 2 MG/ML
4 INJECTION INTRAMUSCULAR; INTRAVENOUS EVERY 6 HOURS PRN
Status: DISCONTINUED | OUTPATIENT
Start: 2019-06-26 | End: 2019-06-26 | Stop reason: SDUPTHER

## 2019-06-26 RX ORDER — ACETAMINOPHEN 325 MG/1
650 TABLET ORAL EVERY 4 HOURS PRN
Status: DISCONTINUED | OUTPATIENT
Start: 2019-06-26 | End: 2019-06-26 | Stop reason: SDUPTHER

## 2019-06-26 RX ORDER — IPRATROPIUM BROMIDE AND ALBUTEROL SULFATE 2.5; .5 MG/3ML; MG/3ML
1 SOLUTION RESPIRATORY (INHALATION)
Status: DISCONTINUED | OUTPATIENT
Start: 2019-06-26 | End: 2019-06-26

## 2019-06-26 RX ORDER — LEVALBUTEROL INHALATION SOLUTION 1.25 MG/3ML
1.25 SOLUTION RESPIRATORY (INHALATION) EVERY 6 HOURS PRN
Status: DISCONTINUED | OUTPATIENT
Start: 2019-06-26 | End: 2019-06-29 | Stop reason: HOSPADM

## 2019-06-26 RX ORDER — FUROSEMIDE 10 MG/ML
40 INJECTION INTRAMUSCULAR; INTRAVENOUS 2 TIMES DAILY
Status: COMPLETED | OUTPATIENT
Start: 2019-06-26 | End: 2019-06-27

## 2019-06-26 RX ORDER — SODIUM CHLORIDE 0.9 % (FLUSH) 0.9 %
10 SYRINGE (ML) INJECTION EVERY 12 HOURS SCHEDULED
Status: DISCONTINUED | OUTPATIENT
Start: 2019-06-26 | End: 2019-06-29 | Stop reason: HOSPADM

## 2019-06-26 RX ORDER — FUROSEMIDE 10 MG/ML
40 INJECTION INTRAMUSCULAR; INTRAVENOUS ONCE
Status: COMPLETED | OUTPATIENT
Start: 2019-06-26 | End: 2019-06-26

## 2019-06-26 RX ORDER — SODIUM CHLORIDE 0.9 % (FLUSH) 0.9 %
10 SYRINGE (ML) INJECTION PRN
Status: DISCONTINUED | OUTPATIENT
Start: 2019-06-26 | End: 2019-06-26 | Stop reason: SDUPTHER

## 2019-06-26 RX ORDER — DIGOXIN 125 MCG
125 TABLET ORAL DAILY
Status: DISCONTINUED | OUTPATIENT
Start: 2019-06-26 | End: 2019-06-29 | Stop reason: HOSPADM

## 2019-06-26 RX ORDER — FUROSEMIDE 40 MG/1
40 TABLET ORAL DAILY
Status: DISCONTINUED | OUTPATIENT
Start: 2019-06-26 | End: 2019-06-26 | Stop reason: SDUPTHER

## 2019-06-26 RX ADMIN — Medication 10 ML: at 20:40

## 2019-06-26 RX ADMIN — FUROSEMIDE 40 MG: 10 INJECTION, SOLUTION INTRAMUSCULAR; INTRAVENOUS at 20:38

## 2019-06-26 RX ADMIN — Medication 10 ML: at 09:17

## 2019-06-26 RX ADMIN — IPRATROPIUM BROMIDE 0.5 MG: 0.5 SOLUTION RESPIRATORY (INHALATION) at 20:00

## 2019-06-26 RX ADMIN — DIGOXIN 125 MCG: 125 TABLET ORAL at 15:03

## 2019-06-26 RX ADMIN — DEXTROSE MONOHYDRATE 10 MG/HR: 50 INJECTION, SOLUTION INTRAVENOUS at 07:26

## 2019-06-26 RX ADMIN — APIXABAN 5 MG: 5 TABLET, FILM COATED ORAL at 09:04

## 2019-06-26 RX ADMIN — APIXABAN 5 MG: 5 TABLET, FILM COATED ORAL at 20:39

## 2019-06-26 RX ADMIN — FUROSEMIDE 40 MG: 10 INJECTION, SOLUTION INTRAMUSCULAR; INTRAVENOUS at 09:04

## 2019-06-26 RX ADMIN — METOPROLOL TARTRATE 100 MG: 50 TABLET ORAL at 15:02

## 2019-06-26 RX ADMIN — IPRATROPIUM BROMIDE 0.5 MG: 0.5 SOLUTION RESPIRATORY (INHALATION) at 18:47

## 2019-06-26 ASSESSMENT — PAIN SCALES - GENERAL
PAINLEVEL_OUTOF10: 0

## 2019-06-26 NOTE — DISCHARGE INSTR - COC
{IP PT MENTAL STATUS:} A+Ox4    IV Access:  PICC single lumen Right Arm    Nursing Mobility/ADLs:  Walking   Dependent  Transfer  Dependent  Bathing  Dependent  Dressing  Dependent  Toileting  Dependent  Feeding  Assisted  Med Admin  Assisted  Med Delivery   whole    Wound Care Documentation and Therapy:        Elimination:  Continence:   · Bowel: bed pan  · Bladder: urinal  Urinary Catheter: {Urinary Catheter:099748039}   Colostomy/Ileostomy/Ileal Conduit: {YES / NELL:33198}       Date of Last BM: today AM , large    Intake/Output Summary (Last 24 hours) at 2019 0922  Last data filed at 2019 0600  Gross per 24 hour   Intake 1327 ml   Output 700 ml   Net 627 ml     I/O last 3 completed shifts: In: 6656 [I.V.:227; IV Piggyback:1100]  Out: 700 [Urine:700]    Safety Concerns:      At Risk for Falls    Impairments/Disabilities:      obese    Nutrition Therapy:  Current Nutrition Therapy:   DIET: CARB CONTROL    Routes of Feeding: {[facility] DME Other Feedings:710896276}  Liquids: {Slp liquid thickness:99210}  Daily Fluid Restriction: {CHP DME Yes amt example:159463255}  Last Modified Barium Swallow with Video (Video Swallowing Test): {Done Not Done HHUS:731643234}    Treatments at the Time of Hospital Discharge:   Respiratory Treatments: ***  Oxygen Therapy:  {Therapy; copd oxygen:53482}  15 L high flow  Ventilator:    { CC Vent RURF:432082556}    Rehab Therapies: {THERAPEUTIC INTERVENTION:8568352718}  Weight Bearing Status/Restrictions: [facility] Weight Bearin}  Other Medical Equipment (for information only, NOT a DME order):  {EQUIPMENT:903564667}  Other Treatments: ***    Patient's personal belongings (please select all that are sent with patient):  {[facility] DME Belongings:828498880} none    RN SIGNATURE:  Electronically signed by Maylin Guzmán RN on 19 at 12:17 PM    CASE MANAGEMENT/SOCIAL WORK SECTION    Inpatient Status Date: 19    Readmission Risk Assessment Score:  Readmission Risk

## 2019-06-26 NOTE — CONSULTS
chloride (KLOR-CON M) 10 MEQ extended release tablet Take 1 tablet by mouth daily (with breakfast) 6/5/19  Yes Grayson Hric, DO   furosemide (LASIX) 40 MG tablet Take 1 tablet by mouth daily 6/4/19  Yes Grayson Hric, DO       Allergies:  Patient has no known allergies. Social History:   TOBACCO:   reports that he quit smoking about 3 years ago. His smoking use included cigarettes. He started smoking about 51 years ago. He smoked 1.50 packs per day. He has never used smokeless tobacco.  ETOH:   reports that he does not drink alcohol. OCCUPATION:      Family History:       Problem Relation Age of Onset    Heart Failure Mother     Heart Failure Father     Crohn's Disease Sister     Alcohol Abuse Brother         REVIEW OF SYSTEMS:  Constitutional: No fever, no chill or change in weight; good appetite  HEENT: No blurred vision, no ear problems, no sore throat, no running nose. Respiratory: +cough, + sputum, no pleuritic chest pain, + shortness of breath  Cardiology: No angina, no dyspnea on exertion, no paroxysmal nocturnal dyspnea, no orthopnea, no palpitation, no leg swelling. Gastroenterology: No dysphagia, no reflux; no abdominal pain, no nausea or vomiting; no constipation or diarrhea. No blood in stool. Genitourinary: No dysuria, no frequency, hesitancy; no hematuria  Musculoskeletal: + joint pain, no myalgia, no change in range of movement  Neurology: no focal weakness in extremities, no slurred speech, no double vision, no tingling or numbness sensation.    Endocrinology: no temperature intolerance, no polyphagia, polydipsia or polyuria  Hematology: no increased bleeding, no bruising, no lymphadenopathy  Skin: no skin change noticed by patient  Psychology: no depressed mood, no suicidal ideation    Physical Exam       Physical Exam:  · Vitals: BP (!) 116/54   Pulse 93   Temp 98.4 °F (36.9 °C)   Resp 19   Ht 5' 8\" (1.727 m)   Wt (!) 338 lb (153.3 kg)   SpO2 91%   BMI 51.39 kg/m²     · I & O - Antibiotic  Days  Day started     Vancomycin          Zosyn                        Oxygen:   Nasal cannula L/min    10   Face mask %     Reservoirs mask %           Lines:  Site  Day  Date inserted     TLC              PICC   x           Arterial line              Peripheral line                           DVT Prophylaxis      Eliquis   x     Enoxaparin        PCDs        Imaging studies:   Imaging  Date  Result    CXR    bilateral pleural effusion                     Resident's Assessment & Plan     Assessment  -Atrial fibrillation with rapid ventricular response s/p cardioversion 2 years ago  -Pneumonia  -Bilateral pleural effusions  -Possible Sepsis  -HFpEF with pulmonary edema  -Acute hypoxic respiratory failure   -Lactic acidosis  -Morbid obesity    Plan  -Patient was given 2 doses of 250 mcg of digoxin with minimal response. Now on cardizem infusion with better response. Continue infusion and monitor blood pressure and pulse for rate control. Can resume betablocker as blood pressure tolerates. Cardiology consulted. Continue Eliquis 5 mg twice daily. Check TSH  -S/p treatment at facility with Levaquin and ceftriaxone, received doses of vancomycin and zosyn in the ED. No fever, no chills, no leucocytosis, will check pro-calcitonin, respiratory culture, gram stain and panel.  -s/p gently hydration with 1 liter of fluid. Follow blood cultures, trend lactic acid.  -Pleural effusion possibly due to pulmonary edema versus pneumonia. Assess response with dirureses and oxygen requirement. Pro-BNP is 5612,baseline 400-500  -Last ECHO in May 2019; EF of 60% . CXR shows some pulmonary edema, continue diuresis with 40 mg of Lasix daily.  Monitor weight and urine output  -Secondary to pneumonia versus pulmonary edema, on supplemental oxygen via nasal cannula, wean as able  -Secondary to hypoperfusion, trend every 6 hours      DVT/GI prophylaxis  Eliquis/ Boyd Castro M.D. , PGY-2  Internal Medicine    Attending

## 2019-06-26 NOTE — H&P
smoking use included cigarettes. He started smoking about 51 years ago. He smoked 1.50 packs per day. He has never used smokeless tobacco.  ETOH:   reports that he does not drink alcohol. Family History:      Reviewed in detail and negative for DM, CAD, Cancer, CVA. Positive as follows:        Problem Relation Age of Onset    Heart Failure Mother     Heart Failure Father     Crohn's Disease Sister     Alcohol Abuse Brother        REVIEW OF SYSTEMS:   Pertinent positives as noted in the HPI. All other systems reviewed and negative. PHYSICAL EXAM:    BP (!) 143/103   Pulse 71   Temp 97.7 °F (36.5 °C) (Oral)   Resp 17   Ht 5' 8\" (1.727 m)   Wt (!) 338 lb (153.3 kg)   SpO2 94%   BMI 51.39 kg/m²     General appearance:  No apparent distress, appears stated age and cooperative. HEENT:  Normal cephalic, atraumatic without obvious deformity. Pupils equal, round, and reactive to light. Extra ocular muscles intact. Conjunctivae/corneas clear. Neck: Supple, with full range of motion. No jugular venous distention. Trachea midline. Respiratory:  Normal respiratory effort. Clear to auscultation, bilaterally without Rales/Wheezes/Rhonchi. Cardiovascular:  Irregular, not tachycardic at time of exam  Abdomen: Soft, non-tender, non-distended with normal bowel sounds. Musculoskeletal:  No clubbing, cyanosis or edema bilaterally. Full range of motion without deformity. Skin: Skin color, texture, turgor normal.  No rashes or lesions. Neurologic:  Neurovascularly intact without any focal sensory/motor deficits. Cranial nerves: II-XII intact, grossly non-focal.  Psychiatric:  Alert and oriented, thought content appropriate, normal insight  Capillary Refill: Brisk,< 3 seconds   Peripheral Pulses: +2 palpable, equal bilaterally       CXR:  I have reviewed the CXR with the following interpretation:  FINDINGS:    Heart size is significantly enlarged.  There is a large left-sided  infiltrate and effusion with limited aeration of the left upper lobe. There is considerable infiltrate and fibrosis in the right mid and  lower hemithorax. There may be some right-sided pleural thickening. Impression:     No interval change. There is limited aeration left upper lobe        EKG:  I have reviewed the EKG with the following interpretation: A fib    Labs:     Recent Labs     06/25/19  1428 06/26/19  0556   WBC 10.0 8.0   HGB 16.3 15.7   HCT 50.7 49.9    224     Recent Labs     06/25/19  1428 06/26/19  0556    138   K 4.9 4.1   CL 94* 99   CO2 27 27   BUN 16 11   CREATININE 0.8 0.7   CALCIUM 8.8 8.3*     No results for input(s): AST, ALT, BILIDIR, BILITOT, ALKPHOS in the last 72 hours. No results for input(s): INR in the last 72 hours. Recent Labs     06/25/19  1428   TROPONINI <0.01       Urinalysis:      Lab Results   Component Value Date    NITRU Negative 05/16/2017    WBCUA 2-5 05/16/2017    BACTERIA FEW 05/16/2017    RBCUA 0-1 05/16/2017    BLOODU Negative 05/16/2017    SPECGRAV 1.020 05/16/2017    GLUCOSEU Negative 05/16/2017         ASSESSMENT:    Active Hospital Problems    Diagnosis Date Noted    Atrial fibrillation with RVR ([facility] [I48.91] 06/25/2019    Sepsis ([facility] [A41.9] 06/25/2019    COPD exacerbation ([facility] [J44.1] 05/30/2019       PLAN:  pulm and cardio consult  Digoxin for a fib/b blocker  Pt ot    DVT Prophylaxis: oral anticoagulation  Diet: DIET CARB CONTROL;  Code Status: Full Code    PT/OT Eval Status: consulted    Dispo - goal [facility] Gisell 50, DO    Thank you Radha Alberts DO for the opportunity to be involved in this patient's care. If you have any questions or concerns please feel free to contact me at [phone].

## 2019-06-27 LAB
ALBUMIN SERPL-MCNC: 2.9 G/DL (ref 3.5–5.2)
ALP BLD-CCNC: 49 U/L (ref 40–129)
ALT SERPL-CCNC: 71 U/L (ref 0–40)
ANION GAP SERPL CALCULATED.3IONS-SCNC: 12 MMOL/L (ref 7–16)
AST SERPL-CCNC: 66 U/L (ref 0–39)
BASOPHILS ABSOLUTE: 0.04 E9/L (ref 0–0.2)
BASOPHILS RELATIVE PERCENT: 0.5 % (ref 0–2)
BILIRUB SERPL-MCNC: 0.3 MG/DL (ref 0–1.2)
BUN BLDV-MCNC: 12 MG/DL (ref 8–23)
CALCIUM SERPL-MCNC: 8.4 MG/DL (ref 8.6–10.2)
CHLORIDE BLD-SCNC: 99 MMOL/L (ref 98–107)
CO2: 31 MMOL/L (ref 22–29)
CREAT SERPL-MCNC: 0.9 MG/DL (ref 0.7–1.2)
EOSINOPHILS ABSOLUTE: 0.23 E9/L (ref 0.05–0.5)
EOSINOPHILS RELATIVE PERCENT: 3.2 % (ref 0–6)
GFR AFRICAN AMERICAN: >60
GFR NON-AFRICAN AMERICAN: >60 ML/MIN/1.73
GLUCOSE BLD-MCNC: 93 MG/DL (ref 74–99)
GRAM STAIN ORDERABLE: NORMAL
HCT VFR BLD CALC: 46.1 % (ref 37–54)
HEMOGLOBIN: 14.8 G/DL (ref 12.5–16.5)
IMMATURE GRANULOCYTES #: 0.08 E9/L
IMMATURE GRANULOCYTES %: 1.1 % (ref 0–5)
LYMPHOCYTES ABSOLUTE: 1.38 E9/L (ref 1.5–4)
LYMPHOCYTES RELATIVE PERCENT: 19 % (ref 20–42)
MCH RBC QN AUTO: 30.3 PG (ref 26–35)
MCHC RBC AUTO-ENTMCNC: 32.1 % (ref 32–34.5)
MCV RBC AUTO: 94.5 FL (ref 80–99.9)
MONOCYTES ABSOLUTE: 0.48 E9/L (ref 0.1–0.95)
MONOCYTES RELATIVE PERCENT: 6.6 % (ref 2–12)
MRSA CULTURE ONLY: NORMAL
NEUTROPHILS ABSOLUTE: 5.07 E9/L (ref 1.8–7.3)
NEUTROPHILS RELATIVE PERCENT: 69.6 % (ref 43–80)
PDW BLD-RTO: 13.9 FL (ref 11.5–15)
PLATELET # BLD: 224 E9/L (ref 130–450)
PMV BLD AUTO: 9.5 FL (ref 7–12)
POTASSIUM REFLEX MAGNESIUM: 3.9 MMOL/L (ref 3.5–5)
POTASSIUM SERPL-SCNC: 3.9 MMOL/L (ref 3.5–5)
PRO-BNP: 2483 PG/ML (ref 0–125)
RBC # BLD: 4.88 E12/L (ref 3.8–5.8)
SODIUM BLD-SCNC: 142 MMOL/L (ref 132–146)
TOTAL PROTEIN: 6.2 G/DL (ref 6.4–8.3)
WBC # BLD: 7.3 E9/L (ref 4.5–11.5)

## 2019-06-27 PROCEDURE — 97161 PT EVAL LOW COMPLEX 20 MIN: CPT

## 2019-06-27 PROCEDURE — 6370000000 HC RX 637 (ALT 250 FOR IP): Performed by: INTERNAL MEDICINE

## 2019-06-27 PROCEDURE — 80053 COMPREHEN METABOLIC PANEL: CPT

## 2019-06-27 PROCEDURE — 36415 COLL VENOUS BLD VENIPUNCTURE: CPT

## 2019-06-27 PROCEDURE — 94660 CPAP INITIATION&MGMT: CPT

## 2019-06-27 PROCEDURE — 6360000002 HC RX W HCPCS: Performed by: INTERNAL MEDICINE

## 2019-06-27 PROCEDURE — 97530 THERAPEUTIC ACTIVITIES: CPT

## 2019-06-27 PROCEDURE — 36591 DRAW BLOOD OFF VENOUS DEVICE: CPT

## 2019-06-27 PROCEDURE — 83880 ASSAY OF NATRIURETIC PEPTIDE: CPT

## 2019-06-27 PROCEDURE — 2580000003 HC RX 258: Performed by: INTERNAL MEDICINE

## 2019-06-27 PROCEDURE — 2700000000 HC OXYGEN THERAPY PER DAY

## 2019-06-27 PROCEDURE — 2060000000 HC ICU INTERMEDIATE R&B

## 2019-06-27 PROCEDURE — 97165 OT EVAL LOW COMPLEX 30 MIN: CPT

## 2019-06-27 PROCEDURE — 85025 COMPLETE CBC W/AUTO DIFF WBC: CPT

## 2019-06-27 PROCEDURE — 80048 BASIC METABOLIC PNL TOTAL CA: CPT

## 2019-06-27 PROCEDURE — 97535 SELF CARE MNGMENT TRAINING: CPT

## 2019-06-27 PROCEDURE — 94640 AIRWAY INHALATION TREATMENT: CPT

## 2019-06-27 RX ORDER — TORSEMIDE 20 MG/1
20 TABLET ORAL DAILY
Status: DISCONTINUED | OUTPATIENT
Start: 2019-06-28 | End: 2019-06-29

## 2019-06-27 RX ADMIN — IPRATROPIUM BROMIDE 0.5 MG: 0.5 SOLUTION RESPIRATORY (INHALATION) at 20:50

## 2019-06-27 RX ADMIN — FUROSEMIDE 40 MG: 10 INJECTION, SOLUTION INTRAMUSCULAR; INTRAVENOUS at 10:13

## 2019-06-27 RX ADMIN — IPRATROPIUM BROMIDE 0.5 MG: 0.5 SOLUTION RESPIRATORY (INHALATION) at 11:53

## 2019-06-27 RX ADMIN — IPRATROPIUM BROMIDE 0.5 MG: 0.5 SOLUTION RESPIRATORY (INHALATION) at 15:42

## 2019-06-27 RX ADMIN — Medication 10 ML: at 17:30

## 2019-06-27 RX ADMIN — FUROSEMIDE 40 MG: 10 INJECTION, SOLUTION INTRAMUSCULAR; INTRAVENOUS at 17:30

## 2019-06-27 RX ADMIN — METOPROLOL TARTRATE 100 MG: 50 TABLET ORAL at 10:13

## 2019-06-27 RX ADMIN — DIGOXIN 125 MCG: 125 TABLET ORAL at 10:12

## 2019-06-27 RX ADMIN — APIXABAN 5 MG: 5 TABLET, FILM COATED ORAL at 20:30

## 2019-06-27 RX ADMIN — Medication 10 ML: at 20:30

## 2019-06-27 RX ADMIN — APIXABAN 5 MG: 5 TABLET, FILM COATED ORAL at 10:13

## 2019-06-27 RX ADMIN — Medication 10 ML: at 10:14

## 2019-06-27 RX ADMIN — IPRATROPIUM BROMIDE 0.5 MG: 0.5 SOLUTION RESPIRATORY (INHALATION) at 09:40

## 2019-06-27 RX ADMIN — POTASSIUM CHLORIDE 10 MEQ: 10 TABLET, EXTENDED RELEASE ORAL at 10:14

## 2019-06-27 ASSESSMENT — PAIN SCALES - GENERAL
PAINLEVEL_OUTOF10: 0

## 2019-06-28 ENCOUNTER — APPOINTMENT (OUTPATIENT)
Dept: CT IMAGING | Age: 69
DRG: 871 | End: 2019-06-28
Payer: MEDICARE

## 2019-06-28 ENCOUNTER — APPOINTMENT (OUTPATIENT)
Dept: GENERAL RADIOLOGY | Age: 69
DRG: 871 | End: 2019-06-28
Payer: MEDICARE

## 2019-06-28 LAB
ALBUMIN SERPL-MCNC: 2.9 G/DL (ref 3.5–5.2)
ALP BLD-CCNC: 47 U/L (ref 40–129)
ALT SERPL-CCNC: 72 U/L (ref 0–40)
ANION GAP SERPL CALCULATED.3IONS-SCNC: 12 MMOL/L (ref 7–16)
AST SERPL-CCNC: 62 U/L (ref 0–39)
BILIRUB SERPL-MCNC: 0.3 MG/DL (ref 0–1.2)
BUN BLDV-MCNC: 15 MG/DL (ref 8–23)
CALCIUM SERPL-MCNC: 8.2 MG/DL (ref 8.6–10.2)
CHLORIDE BLD-SCNC: 94 MMOL/L (ref 98–107)
CO2: 34 MMOL/L (ref 22–29)
CREAT SERPL-MCNC: 0.9 MG/DL (ref 0.7–1.2)
CULTURE, RESPIRATORY: NORMAL
GFR AFRICAN AMERICAN: >60
GFR NON-AFRICAN AMERICAN: >60 ML/MIN/1.73
GLUCOSE BLD-MCNC: 97 MG/DL (ref 74–99)
POTASSIUM SERPL-SCNC: 3.7 MMOL/L (ref 3.5–5)
SMEAR, RESPIRATORY: NORMAL
SODIUM BLD-SCNC: 140 MMOL/L (ref 132–146)
TOTAL PROTEIN: 6.2 G/DL (ref 6.4–8.3)

## 2019-06-28 PROCEDURE — 2060000000 HC ICU INTERMEDIATE R&B

## 2019-06-28 PROCEDURE — 2580000003 HC RX 258: Performed by: INTERNAL MEDICINE

## 2019-06-28 PROCEDURE — 80053 COMPREHEN METABOLIC PANEL: CPT

## 2019-06-28 PROCEDURE — 6370000000 HC RX 637 (ALT 250 FOR IP): Performed by: INTERNAL MEDICINE

## 2019-06-28 PROCEDURE — 71045 X-RAY EXAM CHEST 1 VIEW: CPT

## 2019-06-28 PROCEDURE — 6360000002 HC RX W HCPCS: Performed by: INTERNAL MEDICINE

## 2019-06-28 PROCEDURE — 36415 COLL VENOUS BLD VENIPUNCTURE: CPT

## 2019-06-28 PROCEDURE — 94640 AIRWAY INHALATION TREATMENT: CPT

## 2019-06-28 PROCEDURE — 94660 CPAP INITIATION&MGMT: CPT

## 2019-06-28 PROCEDURE — 71250 CT THORAX DX C-: CPT

## 2019-06-28 PROCEDURE — 2700000000 HC OXYGEN THERAPY PER DAY

## 2019-06-28 RX ORDER — LEVOFLOXACIN 5 MG/ML
500 INJECTION, SOLUTION INTRAVENOUS EVERY 24 HOURS
Status: DISCONTINUED | OUTPATIENT
Start: 2019-06-28 | End: 2019-06-29 | Stop reason: HOSPADM

## 2019-06-28 RX ADMIN — IPRATROPIUM BROMIDE 0.5 MG: 0.5 SOLUTION RESPIRATORY (INHALATION) at 21:34

## 2019-06-28 RX ADMIN — IPRATROPIUM BROMIDE 0.5 MG: 0.5 SOLUTION RESPIRATORY (INHALATION) at 15:22

## 2019-06-28 RX ADMIN — APIXABAN 5 MG: 5 TABLET, FILM COATED ORAL at 20:48

## 2019-06-28 RX ADMIN — Medication 10 ML: at 08:19

## 2019-06-28 RX ADMIN — Medication 10 ML: at 08:20

## 2019-06-28 RX ADMIN — METOPROLOL TARTRATE 100 MG: 50 TABLET ORAL at 08:19

## 2019-06-28 RX ADMIN — LEVOFLOXACIN 500 MG: 5 INJECTION, SOLUTION INTRAVENOUS at 14:23

## 2019-06-28 RX ADMIN — APIXABAN 5 MG: 5 TABLET, FILM COATED ORAL at 08:19

## 2019-06-28 RX ADMIN — DIGOXIN 125 MCG: 125 TABLET ORAL at 08:19

## 2019-06-28 RX ADMIN — TORSEMIDE 20 MG: 20 TABLET ORAL at 08:19

## 2019-06-28 RX ADMIN — IPRATROPIUM BROMIDE 0.5 MG: 0.5 SOLUTION RESPIRATORY (INHALATION) at 07:41

## 2019-06-28 RX ADMIN — POTASSIUM CHLORIDE 10 MEQ: 10 TABLET, EXTENDED RELEASE ORAL at 08:19

## 2019-06-28 RX ADMIN — IPRATROPIUM BROMIDE 0.5 MG: 0.5 SOLUTION RESPIRATORY (INHALATION) at 11:09

## 2019-06-28 RX ADMIN — Medication 10 ML: at 20:50

## 2019-06-28 ASSESSMENT — PAIN SCALES - GENERAL
PAINLEVEL_OUTOF10: 0

## 2019-06-28 NOTE — PLAN OF CARE
Problem: Risk for Impaired Skin Integrity  Goal: Tissue integrity - skin and mucous membranes  Description  Structural intactness and normal physiological function of skin and  mucous membranes.   Outcome: Met This Shift     Problem: Falls - Risk of:  Goal: Will remain free from falls  Description  Will remain free from falls  Outcome: Met This Shift

## 2019-06-28 NOTE — CARE COORDINATION
Spoke with patient, initially he told me plan is to return to [facility], then spouse called in. She tells me she is in the process of moving and does not have a reliable car and needs a facility that is closer to their home. Discussed options and choiced for [facility], and [facility]. [facility]. Referral pending to [facility].

## 2019-06-28 NOTE — PROGRESS NOTES
[address] [facility]  Department of Internal Medicine  Internal Medicine Residency Program  Resident MICU Progress  Note      Patient:  Harper Barkleying 76 y.o. male MRN: [id number]     Date of Service: 6/26/2019    Allergy: Patient has no known allergies. CC: F/u:   Subjective       Pt seen and examined at bedside. Sat fine on NC. CXR fluid overload, pro BNP elevated compared to 20 days prior. Diuretics and cardio consult. Afib RVR resolved now rate controlled. Need rate control of cardizem ggt, will defer to cardiology. Ok to transfer out of ICU with lasix 40 mg IV bid. Objective   Physical Exam:  · Vitals: BP (!) 143/103   Pulse 71   Temp 97.7 °F (36.5 °C) (Oral)   Resp 17   Ht 5' 8\" (1.727 m)   Wt (!) 338 lb (153.3 kg)   SpO2 94%   BMI 51.39 kg/m²     · I & O - 24hr: In: 1327 [I.V.:227]  · Out: 850 [Urine:850]  · General Appearance: alert, appears stated age, cooperative, no distress, morbidly obese and wearing nasal cannula  · HEENT:  Head: Normocephalic, no lesions, without obvious abnormality. · Neck: no adenopathy, supple, symmetrical, trachea midline and thyroid not enlarged, symmetric, no tenderness/mass/nodules  · Lung: diminished breath sounds bilaterally and due to body habitus  · Heart: irregularly irregular rhythm  · Abdomen: soft, non-tender; bowel sounds normal; no masses,  no organomegaly  · Extremities:  extremities normal, atraumatic, no cyanosis or edema  · Musculokeletal: No joint swelling, no muscle tenderness. ROM normal in all joints of extremities.    · Neurologic: Mental status: Alert, oriented, thought content appropriate        Pertinent New Labs & Imaging Studies     CBC:   Lab Results   Component Value Date    WBC 8.0 06/26/2019    RBC 5.19 06/26/2019    HGB 15.7 06/26/2019    HCT 49.9 06/26/2019    MCV 96.1 06/26/2019    MCH 30.3 06/26/2019    MCHC 31.5 06/26/2019    RDW 13.9 06/26/2019     06/26/2019    MPV 9.4 06/26/2019     BMP:    Lab Results   Component
Date: 6/26/2019    Time: 10:41 PM    Patient Placed On BIPAP/CPAP/ Non-Invasive Ventilation? Yes    If no must comment. Facial area red/color change? No           If YES are Blister/Lesion present? No   If yes must notify nursing staff  BIPAP/CPAP skin barrier?   Yes    Skin barrier type:Liquicel       Comments:        Ambrocio Mtz
Physical Therapy    Facility/Department: [address] PICU  Initial Assessment    NAME: Arun Merida  : 1950  MRN: [id number]    Date of Service: 2019    Evaluating Therapist: Abner Sosa PT, DPT      Room #: 1203H  DIAGNOSIS: COPD exacerbation  PRECAUTIONS: Falls, O2 via HFNC    Social:  Patient comes from [place] [address], previously lives with his wife in a 1 floor plan apt with no step(s) and no rail(s) to enter. Prior to admission patient walked with [employer] with assist at the facility. Reported having assist with ADLs at home from his wife. Primarily sponge bathing at home. Reported having a WW at home but did not use prior to previous hospitalization. Initial Evaluation  Date: 19 Treatment  Date: NA    Short Term/ Long Term   Goals   Was pt agreeable to Eval/treatment? Yes      Does pt have pain? Reported pain in low back and R knee. Did not quantify. Bed Mobility  Rolling: Max A +2  Supine to sit: Max A +2  Sit to supine: Max A +2  Scooting: Max A +2  Mod A   Transfers Sit to stand: Max A +2  Stand to sit: Max A +2  Stand pivot: NT  Mod A +1-2   Ambulation    4-5 lateral steps with WW with Max A +2  >10 feet with WW with Mod A +1-2   Stair negotiation: ascended and descended  NT  NA   AM-PAC Score 6/24       Pt is alert and Oriented x3  BLE ROM is limited by body habitus. BLE strength is grossly 4/5. Balance: Seated: SBA; Standing: Max A +2 with [employer]  Sensation: Denied N/T  Edema: Moderate edema noted in BLEs  Endurance: Poor  Bed/Chair alarm: Yes     ASSESSMENT  Pt displays functional ability as noted in the objective portion of this evaluation. Comments/Treatment:  Patient cleared by nurse and agreeable to assessment. SpO2 at rest 88% and educated on proper breathing and able to recover to 90%. Patient found in partial side lying position and required increased time and assist to achieve seated EOB.  Once seated, SpO2 dropped to 83% and again educated on proper breathing and able to recover to
06/26/19  0556 06/27/19  0550    138 142   K 4.9 4.1 3.9  3.9   CL 94* 99 99   CO2 27 27 31*   BUN 16 11 12   CREATININE 0.8 0.7 0.9    ALB:3,BILIDIR:3,BILITOT:3,ALKPHOS:3)@    PT/INR: No results for input(s): PROTIME, INR in the last 72 hours. ABG:   No results for input(s): PH, PO2, PCO2, HCO3, BE, O2SAT, METHB, O2HB, COHB, O2CON, HHB, THB in the last 72 hours. FiO2 : 70 %       Radiology/Other tests reviewed: none    Assessment:     Active Problems:    COPD exacerbation (HCC)    Acute respiratory failure with hypoxia (HCC)    Sepsis (HCC)    Atrial flutter with rapid ventricular response (HCC)    Right bundle branch block    Acute on chronic diastolic (congestive) heart failure (HCC)  Resolved Problems:    * No resolved hospital problems. *      Plan:       1. Repeat CXR tomorrow  2. Cont with AVAPS use at night and prn daytime for naps  3. Cont with nebs, observe cough and sputum production  4. Afib as per Cardiology, watch fluid balance and diurese  5. OOB to chair        Thanks for letting us see this patient in consultation. Please contact us with any questions. Office [phone] or after hours through [employer], [phone].
4.1 3.9  3.9 3.7   CL 99 99 94*   CO2 27 31* 34*   BUN 11 12 15   CREATININE 0.7 0.9 0.9    ALB:3,BILIDIR:3,BILITOT:3,ALKPHOS:3)@    PT/INR: No results for input(s): PROTIME, INR in the last 72 hours. ABG:   No results for input(s): PH, PO2, PCO2, HCO3, BE, O2SAT, METHB, O2HB, COHB, O2CON, HHB, THB in the last 72 hours. FiO2 : 100 %       Radiology/Other tests reviewed: none    Assessment:     Active Problems:    COPD exacerbation (HCC)    Acute respiratory failure with hypoxia (HCC)    Sepsis (HCC)    Atrial flutter with rapid ventricular response (HCC)    Right bundle branch block    Acute on chronic diastolic (congestive) heart failure (HCC)  Resolved Problems:    * No resolved hospital problems. *      Plan:       1. Await CXR today  2. Cont with AVAPS use at night and prn daytime for naps  3. Cont with oxygen use, taper as tolerated  4. Cont with nebs, observe cough and sputum production  5. Afib as per Cardiology, watch fluid balance and diurese  6. OOB to chair        Thanks for letting us see this patient in consultation. Please contact us with any questions. Office [phone] or after hours through [employer], [phone].
 acetaminophen (TYLENOL) tablet 650 mg  650 mg Oral Q4H PRN Dina Hannon MD        apixaban (ELIQUIS) tablet 5 mg  5 mg Oral BID Dina Hannon MD   5 mg at 06/26/19 2039    ondansetron (ZOFRAN) injection 4 mg  4 mg Intravenous Q6H PRN Dina Hannon MD             Physical Exam:  BP (!) 96/51   Pulse 72   Temp 98 °F (36.7 °C) (Temporal)   Resp 15   Ht 5' 8\" (1.727 m)   Wt (!) 342 lb 1.6 oz (155.2 kg)   SpO2 94%   BMI 52.02 kg/m²   Weight change: -14 lb 14.4 oz (-6.759 kg)  Wt Readings from Last 3 Encounters:   06/27/19 (!) 342 lb 1.6 oz (155.2 kg)   06/05/19 (!) 355 lb 3.2 oz (161.1 kg)   05/16/17 (!) 360 lb 3.2 oz (163.4 kg)         Intake/Output:    Intake/Output Summary (Last 24 hours) at 6/27/2019 0948  Last data filed at 6/27/2019 0840  Gross per 24 hour   Intake 330 ml   Output 980 ml   Net -650 ml     I/O this shift:  In: -   Out: 100 [Urine:100]      General: Awake, alert, oriented x3, no acute distress    Neck: No JVD or carotid bruits,     Cardiac: Irregular rhythm with normal rate no murmurs or rubs apical impulse is normal.  Minimal bipedal edema    Resp: Unlabored respirations at rest.  No wheezes, rales or rhonchi. Abdomen: soft, nontender, nondistended, BS+; no masses, bruits, or hepatomegaly    Extremities: no cyanosis, clubbing, or edema. Normal pulses in the upper/lower extremities bilaterally. Skin: Warm and dry, no bruises, petechiae or rashes. Neuro: moves all extremities appropriately to command, and normal sensation to light touch in the upper and lower extremities bilaterally.       Laboratory Tests:  Lab Results   Component Value Date    CREATININE 0.9 06/27/2019    BUN 12 06/27/2019     06/27/2019    K 3.9 06/27/2019    K 3.9 06/27/2019    CL 99 06/27/2019    CO2 31 (H) 06/27/2019     Recent Labs     06/25/19  1428   TROPONINI <0.01     Lab Results   Component Value Date    PROBNP 2,483 (H) 06/27/2019     Lab Results   Component Value Date    WBC 7.3
increase safety and independence with completion of ADL/IADL tasks for functional independence and quality of life.  Bed/chair alarm: yes    Low Evaluation + 15 timed treatment minutes  Tx Time in: 11:30  Tx Time out: 11:45    Evaluation time includes thorough review of current medical information, gathering information on past medical history/social history and prior level of function, completion of standardized testing/informal observation of tasks, assessment of data, and development of POC/Goals    Samy Cade, OTR/L [address] [facility], [facility]..

## 2019-06-29 VITALS
HEIGHT: 68 IN | HEART RATE: 72 BPM | RESPIRATION RATE: 20 BRPM | WEIGHT: 315 LBS | OXYGEN SATURATION: 90 % | BODY MASS INDEX: 47.74 KG/M2 | SYSTOLIC BLOOD PRESSURE: 104 MMHG | DIASTOLIC BLOOD PRESSURE: 55 MMHG | TEMPERATURE: 97.7 F

## 2019-06-29 PROBLEM — A41.9 SEPSIS (HCC): Status: RESOLVED | Noted: 2019-06-25 | Resolved: 2019-06-29

## 2019-06-29 LAB
AADO2: 353.5 MMHG
B.E.: 8.3 MMOL/L (ref -3–3)
COHB: 0.9 % (ref 0–1.5)
CRITICAL: ABNORMAL
DATE ANALYZED: ABNORMAL
DATE OF COLLECTION: ABNORMAL
FIO2: 70 %
HCO3: 35.5 MMOL/L (ref 22–26)
HHB: 7.8 % (ref 0–5)
LAB: ABNORMAL
Lab: ABNORMAL
METHB: 0.2 % (ref 0–1.5)
MODE: ABNORMAL
O2 SATURATION: 92.1 % (ref 92–98.5)
O2HB: 91.1 % (ref 94–97)
OPERATOR ID: 1023
PATIENT TEMP: 37 C
PCO2: 59.4 MMHG (ref 35–45)
PEEP/CPAP: 7 CMH2O
PFO2: 0.92 MMHG/%
PH BLOOD GAS: 7.39 (ref 7.35–7.45)
PO2: 64.3 MMHG (ref 60–100)
RI(T): 5.5
RR MECHANICAL: 14 B/MIN
SOURCE, BLOOD GAS: ABNORMAL
THB: 15.1 G/DL (ref 11.5–16.5)
TIME ANALYZED: 705
VT MECHANICAL: 450 ML

## 2019-06-29 PROCEDURE — 82805 BLOOD GASES W/O2 SATURATION: CPT

## 2019-06-29 PROCEDURE — 94640 AIRWAY INHALATION TREATMENT: CPT

## 2019-06-29 PROCEDURE — 6370000000 HC RX 637 (ALT 250 FOR IP): Performed by: INTERNAL MEDICINE

## 2019-06-29 PROCEDURE — 94660 CPAP INITIATION&MGMT: CPT

## 2019-06-29 PROCEDURE — 6360000002 HC RX W HCPCS: Performed by: INTERNAL MEDICINE

## 2019-06-29 PROCEDURE — 2580000003 HC RX 258: Performed by: INTERNAL MEDICINE

## 2019-06-29 PROCEDURE — 2700000000 HC OXYGEN THERAPY PER DAY

## 2019-06-29 RX ORDER — LEVALBUTEROL INHALATION SOLUTION 1.25 MG/3ML
1 SOLUTION RESPIRATORY (INHALATION) EVERY 6 HOURS PRN
Qty: 360 ML | Refills: 0 | Status: SHIPPED | OUTPATIENT
Start: 2019-06-29 | End: 2019-07-29

## 2019-06-29 RX ORDER — TORSEMIDE 20 MG/1
40 TABLET ORAL 2 TIMES DAILY
Status: DISCONTINUED | OUTPATIENT
Start: 2019-06-29 | End: 2019-06-29 | Stop reason: HOSPADM

## 2019-06-29 RX ORDER — TORSEMIDE 20 MG/1
20 TABLET ORAL ONCE
Status: COMPLETED | OUTPATIENT
Start: 2019-06-29 | End: 2019-06-29

## 2019-06-29 RX ORDER — POTASSIUM CHLORIDE 750 MG/1
10 TABLET, EXTENDED RELEASE ORAL
Qty: 60 TABLET | Refills: 0 | Status: SHIPPED | OUTPATIENT
Start: 2019-06-30

## 2019-06-29 RX ORDER — TORSEMIDE 20 MG/1
40 TABLET ORAL 2 TIMES DAILY
Qty: 30 TABLET | Refills: 3 | Status: SHIPPED | OUTPATIENT
Start: 2019-06-29

## 2019-06-29 RX ADMIN — POTASSIUM CHLORIDE 10 MEQ: 10 TABLET, EXTENDED RELEASE ORAL at 08:24

## 2019-06-29 RX ADMIN — Medication 10 ML: at 08:25

## 2019-06-29 RX ADMIN — DIGOXIN 125 MCG: 125 TABLET ORAL at 08:24

## 2019-06-29 RX ADMIN — IPRATROPIUM BROMIDE 0.5 MG: 0.5 SOLUTION RESPIRATORY (INHALATION) at 07:45

## 2019-06-29 RX ADMIN — METOPROLOL TARTRATE 100 MG: 50 TABLET ORAL at 08:24

## 2019-06-29 RX ADMIN — TORSEMIDE 20 MG: 20 TABLET ORAL at 11:40

## 2019-06-29 RX ADMIN — IPRATROPIUM BROMIDE 0.5 MG: 0.5 SOLUTION RESPIRATORY (INHALATION) at 11:23

## 2019-06-29 RX ADMIN — Medication 10 ML: at 08:24

## 2019-06-29 RX ADMIN — APIXABAN 5 MG: 5 TABLET, FILM COATED ORAL at 08:24

## 2019-06-29 RX ADMIN — TORSEMIDE 20 MG: 20 TABLET ORAL at 08:24

## 2019-06-29 ASSESSMENT — PAIN SCALES - GENERAL: PAINLEVEL_OUTOF10: 0

## 2019-06-29 NOTE — DISCHARGE SUMMARY
Hospitalist Discharge Summary    Patient ID:  Greg Tran  [id number]  83 y.o.  1950    Admit date: 6/25/2019    Discharge date: 6/29/2019    Disposition: Discharge [facility]    Admission Diagnoses:   Patient Active Problem List   Diagnosis    COPD exacerbation ([address]    Acute respiratory failure with hypoxia (HCC)    Atrial fibrillation (HCC)    Chronic obstructive pulmonary disease (HCC)    Elevated LFTs    Atrial fibrillation with RVR (HCC)    Atrial flutter with rapid ventricular response ([address]    Right bundle branch block    Acute on chronic diastolic (congestive) heart failure ([address]       Discharge Diagnoses: Active Problems:    COPD exacerbation (HCC)    Acute respiratory failure with hypoxia (HCC)    Atrial flutter with rapid ventricular response (HCC)    Right bundle branch block    Acute on chronic diastolic (congestive) heart failure ([address]  Resolved Problems:    Sepsis ([facility]      Code Status:  Full Code    Condition:  Stable    Discharge Diet: Diet:  DIET CARB CONTROL;    PCP to do list:        Hospital Course:   Patient is a 76year old man with a history of atrial fibrillation, COPD, morbid obesity who was sent in from nursing home for evaluation of shortness of breath and tachycardia. Patient had been getting IV antibiotics for pneumonia( levofloxacin and ceftriaxone). He also notes associated cough of productive of brownish sputum, denies any chest pain, fever , nausea or vomiting or abdominal pain. He notes general malaise. Patient notes taking all medications as prescribed    In the ED, patient was found to be in Afib RVR and after 2 doses of 250 of digoxin, was started on cardizem bolus and infusion. CXR showed evidence of pulmonary edema and he was given 40 mg of furosemide. Lactic acid was 3.9. He was also hydrated with 1 liter of fluid due to concerns for sepsis. Patient transferred to MICU for further management.      Seen by Cardiology / Pulmonary Nausea  Qty: 84 mL      digoxin (LANOXIN) 125 MCG tablet Take 1 tablet by mouth daily  Qty: 30 tablet, Refills: 3           Current Discharge Medication List      STOP taking these medications       furosemide (LASIX) 40 MG tablet Comments:   Reason for Stopping:                   Procedures: None    Assessment on Discharge: Stable, improved     Discharge ROS:  A complete review of systems was asked and negative shortness of breath     Discharge Exam:  BP (!) 104/55   Pulse 72   Temp 97.8 °F (36.6 °C) (Axillary)   Resp 20   Ht 5' 8\" (1.727 m)   Wt (!) 356 lb 4.8 oz (161.6 kg)   SpO2 90%   BMI 54.18 kg/m²     Gen: Awake, Alert, Boisterous. No Conversation dyspnea   HEENT: NC/AT, moist mucous membranes, no oropharyngeal erythema or exudate  Neck: supple, trachea midline, no anterior cervical or SC LAD  Heart:  Normal s1/s2, RRR, no murmurs, gallops, or rubs. no leg edema  Lungs:  Diminished but clear bilaterally, no wheeze, no rales, no rhonch  Abd: Very Obese abdomen bowel sounds present, soft, nontender, nondistended, no masses  Extrem:  No clubbing, cyanosis,  no edema  Skin: no rashes or lesions  Psych: A & O x3  Neuro: grossly intact, moves all four extremities. Pertinent Studies During Hospital Stay:  Radiology:  Xr Thoracic Spine (2 Views)    Result Date: 2019  Patient MRN:  [id number] : 1950 Age: 76 years Gender: Male Order Date:  2019 11:45 AM EXAM: XR THORACIC SPINE (2 VIEWS) NUMBER OF IMAGES: views INDICATION:  Back pain Back pain COMPARISON: None Alignment of the vertebral bodies appears to be near-anatomic No fracture or foreign body is identified. The disc spaces demonstrate moderate degenerative changes. There is moderate loss of the vertebral body height There are moderate degenerative changes involving the facets.  There is moderate amount of multilevel endplate spurring noted     Findings consistent with moderate degenerative disc disease and degenerative facets disease preparing discharge papers, discussing discharge with patient, medication review, etc.    Thank you Maria Guadalupe Macias DO for the opportunity to be involved in this patient's care.  If you have any questions or concerns please feel free to contact me via [employer]   Electronically signed by Tiana Brooks DO on 6/29/2019 at 10:40 AM

## 2019-07-01 LAB
BLOOD CULTURE, ROUTINE: NORMAL
CULTURE, BLOOD 2: NORMAL

## 2020-11-03 PROBLEM — I48.91 ATRIAL FIBRILLATION (HCC): Status: RESOLVED | Noted: 2020-11-03 | Resolved: 2020-11-03
